# Patient Record
Sex: FEMALE | Race: WHITE
[De-identification: names, ages, dates, MRNs, and addresses within clinical notes are randomized per-mention and may not be internally consistent; named-entity substitution may affect disease eponyms.]

---

## 2018-03-15 ENCOUNTER — HOSPITAL ENCOUNTER (INPATIENT)
Dept: HOSPITAL 17 - NEPE | Age: 83
LOS: 6 days | Discharge: HOME HEALTH | DRG: 840 | End: 2018-03-21
Attending: HOSPITALIST | Admitting: HOSPITALIST
Payer: MEDICARE

## 2018-03-15 VITALS
OXYGEN SATURATION: 92 % | DIASTOLIC BLOOD PRESSURE: 112 MMHG | HEART RATE: 102 BPM | SYSTOLIC BLOOD PRESSURE: 180 MMHG | TEMPERATURE: 97.7 F

## 2018-03-15 VITALS — OXYGEN SATURATION: 98 %

## 2018-03-15 VITALS — HEART RATE: 104 BPM

## 2018-03-15 VITALS
SYSTOLIC BLOOD PRESSURE: 173 MMHG | DIASTOLIC BLOOD PRESSURE: 84 MMHG | OXYGEN SATURATION: 97 % | HEART RATE: 117 BPM | TEMPERATURE: 98.4 F

## 2018-03-15 VITALS
HEART RATE: 111 BPM | SYSTOLIC BLOOD PRESSURE: 152 MMHG | OXYGEN SATURATION: 97 % | DIASTOLIC BLOOD PRESSURE: 80 MMHG | TEMPERATURE: 98.4 F

## 2018-03-15 VITALS
SYSTOLIC BLOOD PRESSURE: 198 MMHG | HEART RATE: 101 BPM | RESPIRATION RATE: 28 BRPM | DIASTOLIC BLOOD PRESSURE: 91 MMHG | OXYGEN SATURATION: 94 %

## 2018-03-15 VITALS
RESPIRATION RATE: 22 BRPM | HEART RATE: 76 BPM | DIASTOLIC BLOOD PRESSURE: 89 MMHG | SYSTOLIC BLOOD PRESSURE: 172 MMHG | OXYGEN SATURATION: 96 % | TEMPERATURE: 98.2 F

## 2018-03-15 VITALS
DIASTOLIC BLOOD PRESSURE: 92 MMHG | SYSTOLIC BLOOD PRESSURE: 175 MMHG | OXYGEN SATURATION: 98 % | TEMPERATURE: 98.4 F | HEART RATE: 98 BPM

## 2018-03-15 VITALS — HEART RATE: 105 BPM

## 2018-03-15 VITALS
HEART RATE: 98 BPM | OXYGEN SATURATION: 97 % | RESPIRATION RATE: 18 BRPM | SYSTOLIC BLOOD PRESSURE: 176 MMHG | DIASTOLIC BLOOD PRESSURE: 100 MMHG

## 2018-03-15 VITALS — BODY MASS INDEX: 25.78 KG/M2 | WEIGHT: 145.51 LBS | HEIGHT: 63 IN

## 2018-03-15 VITALS — OXYGEN SATURATION: 97 %

## 2018-03-15 DIAGNOSIS — Z79.01: ICD-10-CM

## 2018-03-15 DIAGNOSIS — Z88.1: ICD-10-CM

## 2018-03-15 DIAGNOSIS — R74.8: ICD-10-CM

## 2018-03-15 DIAGNOSIS — I50.31: ICD-10-CM

## 2018-03-15 DIAGNOSIS — I11.0: ICD-10-CM

## 2018-03-15 DIAGNOSIS — E87.6: ICD-10-CM

## 2018-03-15 DIAGNOSIS — Z96.649: ICD-10-CM

## 2018-03-15 DIAGNOSIS — Z80.6: ICD-10-CM

## 2018-03-15 DIAGNOSIS — D64.9: ICD-10-CM

## 2018-03-15 DIAGNOSIS — Z88.0: ICD-10-CM

## 2018-03-15 DIAGNOSIS — J98.11: ICD-10-CM

## 2018-03-15 DIAGNOSIS — Z80.7: ICD-10-CM

## 2018-03-15 DIAGNOSIS — I48.2: ICD-10-CM

## 2018-03-15 DIAGNOSIS — E83.119: ICD-10-CM

## 2018-03-15 DIAGNOSIS — C94.6: Primary | ICD-10-CM

## 2018-03-15 DIAGNOSIS — I27.20: ICD-10-CM

## 2018-03-15 DIAGNOSIS — K57.30: ICD-10-CM

## 2018-03-15 DIAGNOSIS — I08.1: ICD-10-CM

## 2018-03-15 DIAGNOSIS — K80.20: ICD-10-CM

## 2018-03-15 DIAGNOSIS — Z88.2: ICD-10-CM

## 2018-03-15 LAB
ALBUMIN SERPL-MCNC: 3.1 GM/DL (ref 3.4–5)
ALP SERPL-CCNC: 213 U/L (ref 45–117)
ALT SERPL-CCNC: 378 U/L (ref 10–53)
AST SERPL-CCNC: 455 U/L (ref 15–37)
BASOPHILS # BLD AUTO: 0.3 TH/MM3 (ref 0–0.2)
BASOPHILS NFR BLD: 1.6 % (ref 0–2)
BILIRUB SERPL-MCNC: 2.7 MG/DL (ref 0.2–1)
BUN SERPL-MCNC: 19 MG/DL (ref 7–18)
CALCIUM SERPL-MCNC: 8.7 MG/DL (ref 8.5–10.1)
CHLORIDE SERPL-SCNC: 105 MEQ/L (ref 98–107)
CREAT SERPL-MCNC: 0.7 MG/DL (ref 0.5–1)
D-DIMER: 0.8 MG/L FEU (ref 0–0.5)
EOSINOPHIL # BLD: 0.2 TH/MM3 (ref 0–0.4)
EOSINOPHIL NFR BLD: 1.1 % (ref 0–4)
ERYTHROCYTE [DISTWIDTH] IN BLOOD BY AUTOMATED COUNT: 18 % (ref 11.6–17.2)
GFR SERPLBLD BASED ON 1.73 SQ M-ARVRAT: 80 ML/MIN (ref 89–?)
GLUCOSE SERPL-MCNC: 165 MG/DL (ref 74–106)
HCO3 BLD-SCNC: 21.2 MEQ/L (ref 21–32)
HCT VFR BLD CALC: 24.2 % (ref 35–46)
HGB BLD-MCNC: 7.9 GM/DL (ref 11.6–15.3)
INR PPP: 2.5 RATIO
LYMPHOCYTES # BLD AUTO: 2.3 TH/MM3 (ref 1–4.8)
LYMPHOCYTES NFR BLD AUTO: 12.2 % (ref 9–44)
LYMPHOCYTES: 14 % (ref 9–44)
MAGNESIUM SERPL-MCNC: 1.7 MG/DL (ref 1.5–2.5)
MCH RBC QN AUTO: 32.7 PG (ref 27–34)
MCHC RBC AUTO-ENTMCNC: 32.7 % (ref 32–36)
MCV RBC AUTO: 100.2 FL (ref 80–100)
MONOCYTE #: 3.8 TH/MM3 (ref 0–0.9)
MONOCYTES NFR BLD: 20 % (ref 0–8)
MONOCYTES: 13 % (ref 0–8)
MYELOCYTES NFR BLD: 2 % (ref 0–0)
NEUTROPHILS # BLD AUTO: 12.5 TH/MM3 (ref 1.8–7.7)
NEUTROPHILS NFR BLD AUTO: 65.1 % (ref 16–70)
NEUTS BAND # BLD MANUAL: 13.9 TH/MM3 (ref 1.8–7.7)
NEUTS BAND NFR BLD: 12 % (ref 0–6)
NEUTS SEG NFR BLD MANUAL: 59 % (ref 16–70)
PLATELET # BLD: 201 TH/MM3 (ref 150–450)
PMV BLD AUTO: 10.1 FL (ref 7–11)
PROT SERPL-MCNC: 7.8 GM/DL (ref 6.4–8.2)
PROTHROMBIN TIME: 25.1 SEC (ref 9.8–11.6)
RBC # BLD AUTO: 2.41 MIL/MM3 (ref 4–5.3)
SODIUM SERPL-SCNC: 137 MEQ/L (ref 136–145)
TOXIC GRANULES BLD QL SMEAR: (no result)
TROPONIN I SERPL-MCNC: (no result) NG/ML (ref 0.02–0.05)
WBC # BLD AUTO: 19.1 TH/MM3 (ref 4–11)

## 2018-03-15 PROCEDURE — 84439 ASSAY OF FREE THYROXINE: CPT

## 2018-03-15 PROCEDURE — 83550 IRON BINDING TEST: CPT

## 2018-03-15 PROCEDURE — 80061 LIPID PANEL: CPT

## 2018-03-15 PROCEDURE — 84443 ASSAY THYROID STIM HORMONE: CPT

## 2018-03-15 PROCEDURE — 82728 ASSAY OF FERRITIN: CPT

## 2018-03-15 PROCEDURE — 85027 COMPLETE CBC AUTOMATED: CPT

## 2018-03-15 PROCEDURE — 86900 BLOOD TYPING SEROLOGIC ABO: CPT

## 2018-03-15 PROCEDURE — 86140 C-REACTIVE PROTEIN: CPT

## 2018-03-15 PROCEDURE — 80053 COMPREHEN METABOLIC PANEL: CPT

## 2018-03-15 PROCEDURE — 86901 BLOOD TYPING SEROLOGIC RH(D): CPT

## 2018-03-15 PROCEDURE — 84425 ASSAY OF VITAMIN B-1: CPT

## 2018-03-15 PROCEDURE — 36430 TRANSFUSION BLD/BLD COMPNT: CPT

## 2018-03-15 PROCEDURE — 83880 ASSAY OF NATRIURETIC PEPTIDE: CPT

## 2018-03-15 PROCEDURE — 86850 RBC ANTIBODY SCREEN: CPT

## 2018-03-15 PROCEDURE — 85007 BL SMEAR W/DIFF WBC COUNT: CPT

## 2018-03-15 PROCEDURE — 88377 M/PHMTRC ALYS ISHQUANT/SEMIQ: CPT

## 2018-03-15 PROCEDURE — 83540 ASSAY OF IRON: CPT

## 2018-03-15 PROCEDURE — 83735 ASSAY OF MAGNESIUM: CPT

## 2018-03-15 PROCEDURE — 93005 ELECTROCARDIOGRAM TRACING: CPT

## 2018-03-15 PROCEDURE — 82550 ASSAY OF CK (CPK): CPT

## 2018-03-15 PROCEDURE — P9016 RBC LEUKOCYTES REDUCED: HCPCS

## 2018-03-15 PROCEDURE — 86920 COMPATIBILITY TEST SPIN: CPT

## 2018-03-15 PROCEDURE — 74177 CT ABD & PELVIS W/CONTRAST: CPT

## 2018-03-15 PROCEDURE — 80074 ACUTE HEPATITIS PANEL: CPT

## 2018-03-15 PROCEDURE — 81001 URINALYSIS AUTO W/SCOPE: CPT

## 2018-03-15 PROCEDURE — 76705 ECHO EXAM OF ABDOMEN: CPT

## 2018-03-15 PROCEDURE — 70450 CT HEAD/BRAIN W/O DYE: CPT

## 2018-03-15 PROCEDURE — 85610 PROTHROMBIN TIME: CPT

## 2018-03-15 PROCEDURE — 30233N1 TRANSFUSION OF NONAUTOLOGOUS RED BLOOD CELLS INTO PERIPHERAL VEIN, PERCUTANEOUS APPROACH: ICD-10-PCS | Performed by: FAMILY MEDICINE

## 2018-03-15 PROCEDURE — 94618 PULMONARY STRESS TESTING: CPT

## 2018-03-15 PROCEDURE — 84100 ASSAY OF PHOSPHORUS: CPT

## 2018-03-15 PROCEDURE — 85730 THROMBOPLASTIN TIME PARTIAL: CPT

## 2018-03-15 PROCEDURE — 85379 FIBRIN DEGRADATION QUANT: CPT

## 2018-03-15 PROCEDURE — 84484 ASSAY OF TROPONIN QUANT: CPT

## 2018-03-15 PROCEDURE — 85652 RBC SED RATE AUTOMATED: CPT

## 2018-03-15 PROCEDURE — 71275 CT ANGIOGRAPHY CHEST: CPT

## 2018-03-15 PROCEDURE — 83036 HEMOGLOBIN GLYCOSYLATED A1C: CPT

## 2018-03-15 PROCEDURE — 71045 X-RAY EXAM CHEST 1 VIEW: CPT

## 2018-03-15 PROCEDURE — 93306 TTE W/DOPPLER COMPLETE: CPT

## 2018-03-15 PROCEDURE — 81256 HFE GENE: CPT

## 2018-03-15 PROCEDURE — 82607 VITAMIN B-12: CPT

## 2018-03-15 PROCEDURE — 82746 ASSAY OF FOLIC ACID SERUM: CPT

## 2018-03-15 RX ADMIN — AZTREONAM SCH MLS/HR: 1 INJECTION, POWDER, LYOPHILIZED, FOR SOLUTION INTRAMUSCULAR; INTRAVENOUS at 20:45

## 2018-03-15 RX ADMIN — Medication SCH ML: at 20:47

## 2018-03-15 NOTE — RADRPT
EXAM DATE/TIME:  03/15/2018 18:09 

 

[facility] COMPARISON:     

No previous studies available for comparison.

 

 

INDICATIONS :     

Nausea,vomiting,diarrhea

                      

 

IV CONTRAST:     

80 cc Omnipaque 350 (iohexol) IV ; Cumulative dose for multiple exams.

 

 

ORAL CONTRAST:      

No oral contrast ingested.

                      

 

RADIATION DOSE:     

16.50 CTDIvol (mGy) 

 

 

MEDICAL HISTORY :     

Hypertension.  A-fib

 

SURGICAL HISTORY :      

Hysterectomy. 

 

ENCOUNTER:      

Initial

 

ACUITY:      

3 weeks

 

PAIN SCALE:      

0/10

 

LOCATION:         

Abdomen

 

TECHNIQUE:     

Volumetric scanning of the abdomen and pelvis was performed.  Using automated exposure control and ad
justment of the mA and/or kV according to patient size, radiation dose was kept as low as reasonably 
achievable to obtain optimal diagnostic quality images.  DICOM format image data is available electro
nically for review and comparison.  

 

FINDINGS:     

There is air in the lumen of the urinary bladder and also some air within the right wall of the urina
ry bladder. Etiology of this is uncertain. There is adjacent sigmoid colon diverticulosis but no high
-grade inflammatory changes are seen. No abscess or perforation. The pelvic cavity structures are con
siderably obscured due to to metallic streak artifact from the patient's eye lateral hip arthroplasti
es. There is small free fluid. Apparent previous hysterectomy.

 

Mildly heterogeneous liver suggesting vascular congestion. No focal hepatic lesions seen. The spleen,
 pancreas and adrenal glands and kidneys are within normal limits.

 

Shotty retroperitoneal and mesenteric lymph nodes. Nothing pathologic by size criteria.

 

There is a minimally displaced fracture of the left inferior pubic ramus without definite healing. De
generative changes are seen of the spine.

 

 

CONCLUSION:     

1. Air in the urinary bladder including some in the right wall of the bladder. This is nonspecific bu
t the differential would include emphysematous cystitis but this is considered somewhat unlikely as I
 don't see significant wall thickening or high-grade inflammatory changes. A colovesical fistula woul
d also be in the differential. Diverticulosis without definite diverticulitis seen of the adjacent si
gmoid colon. Finally, as there is age indeterminate but potentially acute left inferior pubic ramus f
racture, the air in the bladder could be posttraumatic. If so, only a small amount of urine has allie
d.

2. Suspected venous congestion of the liver. Otherwise, solid organs are within normal limits.

 

 

 

 Anderson Weldon MD on March 15, 2018 at 18:35           

Board Certified Radiologist.

 This report was verified electronically.

## 2018-03-15 NOTE — RADRPT
EXAM DATE/TIME:  03/15/2018 15:46 

 

[facility] COMPARISON:     

No previous studies available for comparison.

 

 

INDICATIONS :     

Patient complains of dizziness.

                      

 

RADIATION DOSE:     

33.50 CTDIvol (mGy) 

 

 

 

MEDICAL HISTORY :     

Hypertension.  A fib

 

SURGICAL HISTORY :      

Hysterectomy. 

 

ENCOUNTER:      

Initial

 

ACUITY:      

3 days

 

PAIN SCALE:      

0/10

 

LOCATION:        

cranial 

 

TECHNIQUE:     

Multiple contiguous axial images were obtained of the head.  Using automated exposure control and adj
ustment of the mA and/or kV according to patient size, radiation dose was kept as low as reasonably a
chievable to obtain optimal diagnostic quality images.   DICOM format image data is available electro
nically for review and comparison.  

 

FINDINGS:     

 

CEREBRUM:     

Mild cerebral atrophy is noted. Scattered old tiny lacunar infarcts are noted involving the left basa
l ganglia and the right cerebellar hemisphere. Mild periventricular white matter small vessel ischemi
c changes are noted bilaterally. No evidence of midline shift, mass lesion, hemorrhage or acute infar
ction.  No extra-axial fluid collections are seen.

 

POSTERIOR FOSSA:     

The cerebellum and brainstem are intact.  The 4th ventricle is midline.  The cerebellopontine angle i
s unremarkable.

 

EXTRACRANIAL:     

The visualized portion of the orbits is intact.

 

SKULL:     

The calvaria is intact.  No evidence of skull fracture.

 

CONCLUSION:     

1. Mild periventricular white matter small vessel ischemic changes bilaterally. 

2. Mild cerebral atrophy. 

3. Scattered old lacunar infarcts within the left basal ganglia and right cerebellar hemisphere. 

4. No acute infarct, acute hemorrhage, midline shift or extra-axial fluid collections. 

 

 

 Pelon García MD on March 15, 2018 at 15:57           

Board Certified Radiologist.

 This report was verified electronically.

## 2018-03-15 NOTE — HHI.HP
__________________________________________________





[facility]


Service


[facility]


Primary Care Physician


No Primary Care Physician


Admission Diagnosis





Symptomatic anemia/ syncope /leukocytosis


Diagnoses:  


Chief Complaint:  


Exertional dyspnea


Travel History


International Travel<30 Days:  No


Contact w/Intl Traveler <30 Da:  No


Traveled to Known Affected Are:  No


History of Present Illness


This is an 83-year-old female with past medical history of hypertension, atrial 

fibrillation, hemochromatosis, on chronic Coumadin who presented with 

exertional dyspnea.  Patient stated that about 3 weeks ago she had a virus in 

which she had GI symptoms such as nausea vomiting.  She stated that resolved 

she started feeling better but a few days ago symptoms recur.  She denies any 

abdominal pain.  Denies any urinary symptoms.  Patient then stated that she 

felt very fatigued and with exertion she felt very short of breath.  Patient 

stated that she never had these symptoms before.  She denies any chest pain, 

palpitation, lightheadedness dizziness.  Patient stated that symptoms were 

significant enough for her to come to emergency department.  She denies any GI 

bleed.  [place]  Denies any fevers or chills.  

Denies any cough.





All other review of system reviewed and negative.





During my interview with patient heart rate was above 100 during the majority 

of the interview and went up to 150.  Patient stated that she took her morning 

medication today.





Past Family Social History


Past Medical History


Hypertension


Hemochromatosis


Atrial fibrillation


On chronic Coumadin


Past Surgical History


2 hip replacement


Total hysterectomy secondary to fibroids


Lower extremity vein stripping


Reported Medications








Warfarin 2.5 Mg Tab 2.5 Mg PO DAILY


Valsartan 320 Mg Tab 320 Mg PO DAILY


Premarin (Estrogens Conjugated) 0.3 Mg Tab 0.3 Mg PO DAILY


Omeprazole 20 Mg Tab 20 Mg PO DAILY


Metoprolol Succinate/HCTZ 100-12.5  Mg-12.5 Mg Tab 1 Tab PO DAILY


Cartia Xt (Diltiazem ER 24 HR) 120 Mg Caper 120 Mg PO DAILY


Allergies:  


Coded Allergies:  


     Penicillins (Verified  Allergy, Severe, vomiting, 3/15/18)


     Sulfa (Sulfonamide Antibiotics) (Verified  Allergy, Severe, vomiting, 3/15/

18)


     ciprofloxacin (Verified  Allergy, Severe, vomiting, 3/15/18)


Active Ordered Medications





Current Medications


Sodium Chloride (NS Flush) 2 ml UNSCH  PRN IVF FLUSH AFTER USING IV ACCESS;  

Start 3/15/18 at 15:15


Sodium Chloride 250 ml @  15 mls/hr ONCE  ONCE IV ;  Start 3/15/18 at 17:00;  

Stop 3/16/18 at 09:39


Sodium Chloride 500 ml @  500 mls/hr BOLUS  ONCE IV  Last administered on 3/15/

18at 17:02;  Start 3/15/18 at 17:00;  Stop 3/15/18 at 17:59


Sodium Chloride (NS Flush) 2 ml UNSCH  PRN IV FLUSH FLUSH AFTER USING IV ACCESS

;  Start 3/15/18 at 17:15;  Stop 3/15/18 at 17:15;  Status DC


Sodium Chloride (NS Flush) 2 ml BID IV FLUSH ;  Start 3/15/18 at 21:00;  Stop 3/

15/18 at 21:00;  Status DC


Nitroglycerin (Nitroglycerin 2% Oint) 1 inch Q6HR TOP ;  Start 3/15/18 at 18:00

;  Stop 3/15/18 at 18:00;  Status DC


Aspirin (Aspirin) 325 mg DAILY PO ;  Start 3/16/18 at 09:00;  Stop 3/16/18 at 09

:00;  Status DC


Sodium Chloride (NS Flush) 2 ml UNSCH  PRN IV FLUSH FLUSH AFTER USING IV ACCESS

;  Start 3/15/18 at 17:30;  Status UNV


Sodium Chloride (NS Flush) 2 ml BID IV FLUSH ;  Start 3/15/18 at 21:00;  Status 

UNV


Acetaminophen (Tylenol) 650 mg Q4H  PRN PO TEMP > 100.4;  Start 3/15/18 at 17:30

;  Status UNV


Naloxone HCl (Narcan Inj) 0.4 mg UNSCH  PRN IV PUSH SEE LABEL COMMENTS;  Start 3

/15/18 at 17:30;  Status UNV


Magnesium Hydroxide (Milk Of Magnesia Liq) 30 ml Q12H  PRN PO Mild constipation

;  Start 3/15/18 at 17:30;  Status UNV


Sennosides (Senokot) 17.2 mg Q12H  PRN PO Moderate constipation;  Start 3/15/18 

at 17:30;  Status UNV


Lactulose (Lactulose Liq) 30 ml DAILY  PRN PO SEVERE CONSITIPATION;  Start 3/15/

18 at 17:30;  Status UNV


Family History


Brother had a history of leukemia  at the age of 40


Mother has a history of lymphoma





Physical Exam


Vital Signs





Vital Signs








  Date Time  Temp Pulse Resp B/P (MAP) Pulse Ox O2 Delivery O2 Flow Rate FiO2


 


3/15/18 17:09  98 18 176/100 (125) 97 Nasal Cannula 2.00 


 


3/15/18 15:39     (126) 97 Nasal Cannula 2.00 


 


3/15/18 15:03  101 28 198/91 (126) 94 Room Air  


 


3/15/18 14:59  72 27  92 Room Air  


 


3/15/18 14:49 98.2 76 22 172/89 (116) 96   








Physical Exam


GENERAL: This is a well-nourished, well-developed patient, in no apparent 

distress.


SKIN: No rashes, ecchymoses or lesions. Cool and dry.


HEAD: Atraumatic. Normocephalic. No temporal or scalp tenderness.


EYES: Pupils equal round and reactive. Extraocular motions intact. No scleral 

icterus. No injection or drainage. 


ENT: Nose without bleeding, purulent drainage or septal hematoma. Throat 

without erythema, tonsillar hypertrophy or exudate. Uvula midline. Airway 

patent.


NECK: Trachea midline. No JVD or lymphadenopathy. Supple, nontender, no 

meningeal signs.


CARDIOVASCULAR: Regular rate and rhythm without murmurs, gallops, or rubs. 


RESPIRATORY: Clear to auscultation. Breath sounds equal bilaterally. No wheezes

, rales, or rhonchi.  


GASTROINTESTINAL: Abdomen soft, non-tender, nondistended. No hepato-splenomegaly

, or palpable masses. No guarding.


MUSCULOSKELETAL: Extremities without clubbing, cyanosis, or edema. No joint 

tenderness, effusion, or edema noted. No calf tenderness. Negative Homans sign 

bilaterally.


NEUROLOGICAL: Awake and alert. Cranial nerves II through XII intact.  Motor and 

sensory grossly within normal limits. Five out of 5 muscle strength in all 

muscle groups.  Normal speech.


Laboratory





Laboratory Tests








Test


  3/15/18


15:15


 


White Blood Count 19.1 


 


Red Blood Count 2.41 


 


Hemoglobin 7.9 


 


Hematocrit 24.2 


 


Mean Corpuscular Volume 100.2 


 


Mean Corpuscular Hemoglobin 32.7 


 


Mean Corpuscular Hemoglobin


Concent 32.7 


 


 


Red Cell Distribution Width 18.0 


 


Platelet Count 201 


 


Mean Platelet Volume 10.1 


 


Neutrophils (%) (Auto) 65.1 


 


Lymphocytes (%) (Auto) 12.2 


 


Monocytes (%) (Auto) 20.0 


 


Eosinophils (%) (Auto) 1.1 


 


Basophils (%) (Auto) 1.6 


 


Neutrophils # (Auto) 12.5 


 


Lymphocytes # (Auto) 2.3 


 


Monocytes # (Auto) 3.8 


 


Eosinophils # (Auto) 0.2 


 


Basophils # (Auto) 0.3 


 


CBC Comment AUTO DIFF 


 


Differential Total Cells


Counted 100 


 


 


Neutrophils % (Manual) 59 


 


Band Neutrophils % 12 


 


Lymphocytes % 14 


 


Monocytes % 13 


 


Neutrophils # (Manual) 13.9 


 


Myelocytes 2 


 


Differential Comment


  FINAL DIFF


MANUAL


 


Toxic Granulation 1+ 


 


Platelet Estimate NORMAL 


 


Platelet Morphology Comment NORMAL 


 


Prothrombin Time 25.1 


 


Prothromb Time International


Ratio 2.5 


 


 


Activated Partial


Thromboplast Time 36.9 


 


 


D-Dimer Quantitative (PE/DVT) 0.80 


 


Blood Urea Nitrogen 19 


 


Creatinine 0.70 


 


Random Glucose 165 


 


Total Protein 7.8 


 


Albumin 3.1 


 


Calcium Level 8.7 


 


Magnesium Level 1.7 


 


Alkaline Phosphatase 213 


 


Aspartate Amino Transf


(AST/SGOT) 455 


 


 


Alanine Aminotransferase


(ALT/SGPT) 378 


 


 


Total Bilirubin 2.7 


 


Sodium Level 137 


 


Potassium Level 3.9 


 


Chloride Level 105 


 


Carbon Dioxide Level 21.2 


 


Anion Gap 11 


 


Estimat Glomerular Filtration


Rate 80 


 


 


Troponin I LESS THAN 0.02 








Result Diagram:  


3/15/18 1515                                                                   

             3/15/18 1515





Imaging





Last Impressions








Head CT 3/15/18 151 Signed





Impressions: 





 Service Date/Time:  Thursday, March 15, 2018 15:46 - CONCLUSION:  1. Mild 





 periventricular white matter small vessel ischemic changes bilaterally.  2. 

Mild 





 cerebral atrophy.  3. 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 





 ganglia and right cerebellar hemisphere.  4. No acute infarct, acute hemorrhage

, 





 midline shift or extra-axial fluid collections.     Pelon García MD 


 


Chest X-Ray 3/15/18 151 Signed





Impressions: 





 Service Date/Time:  Thursday, March 15, 2018 15:31 - CONCLUSION:  Bibasilar 





 patchiness consistent with atelectasis and/or infiltrates.     Pelon García MD 











Caprini VTE Risk Assessment


Caprini VTE Risk Assessment:  Mod/High Risk (score >= 2)


Caprini Risk Assessment Model











 Point Value = 1          Point Value = 2  Point Value = 3  Point Value = 5


 


Age 41-60


Minor surgery


BMI > 25 kg/m2


Swollen legs


Varicose veins


Pregnancy or postpartum


History of unexplained or recurrent


   spontaneous 


Oral contraceptives or hormone


   replacement


Sepsis (< 1 month)


Serious lung disease, including


   pneumonia (< 1 month)


Abnormal pulmonary function


Acute myocardial infarction


Congestive heart failure (< 1 month)


History of inflammatory bowel disease


Medical patient at bed rest Age 61-74


Arthroscopic surgery


Major open surgery (> 45 min)


Laparoscopic surgery (> 45 min)


Malignancy


Confined to bed (> 72 hours)


Immobilizing plaster cast


Central venous access Age >= 75


History of VTE


Family history of VTE


Factor V Leiden


Prothrombin 18714Q


Lupus anticoagulant


Anticardiolipin antibodies


Elevated serum homocysteine


Heparin-induced thrombocytopenia


Other congenital or acquired


   thrombophilia Stroke (< 1 month)


Elective arthroplasty


Hip, pelvis, or leg fracture


Acute spinal cord injury (< 1 month)








Prophylaxis Regimen











   Total Risk


Factor Score Risk Level Prophylaxis Regimen


 


0-1      Low Early ambulation


 


2 Moderate Order ONE of the following:


*Sequential Compression Device (SCD)


*Heparin 5000 units SQ BID


 


3-4 Higher Order ONE of the following medications:


*Heparin 5000 units SQ TID


*Enoxaparin/Lovenox 40 mg SQ daily (WT < 150 kg, CrCl > 30 mL/min)


*Enoxaparin/Lovenox 30 mg SQ daily (WT < 150 kg, CrCl > 10-29 mL/min)


*Enoxaparin/Lovenox 30 mg SQ BID (WT < 150 kg, CrCl > 30 mL/min)


AND/OR


*Sequential Compression Device (SCD)


 


5 or more Highest Order ONE of the following medications:


*Heparin 5000 units SQ TID (Preferred with Epidurals)


*Enoxaparin/Lovenox 40 mg SQ daily (WT < 150 kg, CrCl > 30 mL/min)


*Enoxaparin/Lovenox 30 mg SQ daily (WT < 150 kg, CrCl > 10-29 mL/min)


*Enoxaparin/Lovenox 30 mg SQ BID (WT < 150 kg, CrCl > 30 mL/min)


AND


*Sequential Compression Device (SCD)











Assessment and Plan


Assessment and Plan


This is an 83-year-old female past medical history of atrial fibrillation who 

presented with exertional dyspnea





Exertional dyspnea


-May be secondary to atrial fibrillation with RVR since noted during her 

examination patient heart rate went above 100 multiple times versus symptomatic 

anemia versus questionable pneumonia.


-Blood transfusion already order by ED provider.  Will get a posttransfusion 

hemoglobin.  Patient has no active bleeding.  Will get a 2D echo and trend 

troponin. ?  Monitor over telemetry.





Symptomatic anemia


-Patient does have a history of anemia but she stated last hemoglobin down was 

11.


-Blood transfusion ordered by ED provider.  Will get a posttransfusion 

hemoglobin.


-We will check for Hemoccult stool.  Patient is on Coumadin to prevent CVA.  

There is no active bleeding noted.  Will monitor and trend.


-Continue to trend hemoglobin for stabilization.





Atrial fibrillation with RVR


-Patient heart rate varies from .  Her heart rate in the ED mostly in the 

lower 100s-110s.


-We will restart her home medication.  May improve after blood transfusion.  

Will give Cardizem 30 mg as needed for heart rate greater than 100.


-Continue with Coumadin.





Chronic anticoagulation


-INR 2.5.  Continuing Coumadin.  Will have pharmacy dose.





Leukocytosis


-WBC 19,000.  Chest x-ray showed Chest x-ray shows bilateral basilar 

infiltrate.  This may represent pneumonia versus atelectasis.  Pending UA.


-Questionable community acquired pneumonia


-Patient did recently have a viral illness.   Most likely atelectasis but 

patient does have a significant white count.


-We will treat empirically with antibiotics until infectious etiology ruled 

out.  We will start aztreonam since patient is allergic to penicillin, sulfa 

drugs, and Cipro.


-Continue to trend leukocytosis.





Elevated LFTs


-Patient does have a history of hemochromatosis.


-We will get liver ultrasound and hepatitis panel.  Trend LFTs.





DVT prophylaxis


-Currently on Coumadin.  Continue with Coumadin.


Code Status


full


Discussed Condition With


Patient and her .





Physician Certification


2 Midnight Certification Type:  Admission for Inpatient Services


Order for Inpatient Services


The services are ordered in accordance with Medicare regulations or non-

Medicare payer requirements, as applicable.  In the case of services not 

specified as inpatient-only, they are appropriately provided as inpatient 

services in accordance with the 2-midnight benchmark.


Estimated LOS (days):  3


3 days is the estimated time the patient will need to remain in the hospital, 

assuming treatment plan goals are met and no additional complications.


Post-Hospital Plan:  Larisa Lawler MD Mar 15, 2018 17:31

## 2018-03-15 NOTE — RADRPT
EXAM DATE/TIME:  03/15/2018 18:09 

 

CORRECTION

Corrected on: March 15, 2018; 

 

 

 

[facility] COMPARISON:     

No previous studies available for comparison.

 

 

INDICATIONS :     

Syncope,shorness of breath.

                      

 

IV CONTRAST:     

80 cc Omnipaque 350 (iohexol) IV ; Cumulative dose for multiple exams.

 

 

RADIATION DOSE:     

8.52 CTDIvol (mGy) 

 

 

MEDICAL HISTORY :     

Hypertension.  A-fib

 

SURGICAL HISTORY :      

Hysterectomy. 

 

ENCOUNTER:      

Initial

 

ACUITY:      

3 days

 

PAIN SCALE:      

0/10

 

LOCATION:        

chest 

 

TECHNIQUE:     

Volumetric scanning of the chest was performed using a pulmonary embolism protocol MIP images were re
constructed.  Using automated exposure control and adjustment of the mA and/or kV according to patien
t size, radiation dose was kept as low as reasonably achievable to obtain optimal diagnostic quality 
images.   DICOM format image data is available electronically for review and comparison.  

 

Follow-up recommendations for detected pulmonary nodules are based at a minimum on nodule size and pa
tient risk factors according to Fleischner Society Guidelines.

 

FINDINGS:     

No pulmonary embolus.

 

Mild cardiac enlargement, mostly the right and left atria. Coronary artery calcification noted.

 

Small to moderate bilateral pleural effusions are present and there is associated mild dependent/comp
ressive atelectasis of the bases.

 

There are mildly enlarged mediastinal lymph nodes for example pretracheal 17 x 22 mm and subcarinal 2
1 x 30 mm. Shotty bilateral axillary lymph nodes.

 

CONCLUSION:     

1. No pulmonary embolus.

2. Small to moderate bilateral pleural effusions.

3. Mild dependent/compressive atelectasis of both bases.

4. Mild biatrial enlargement of the heart.

5. Coronary artery calcification.

6. Nonspecific mediastinal lymphadenopathy.

 

 

 

 

 Anderson Weldon MD on March 15, 2018 at 18:30           

Board Certified Radiologist.

 This report was verified electronically.  Anderson Weldon MD on March 15, 2018 at 18:40           

Board Certified Radiologist.

 This report was verified electronically.

## 2018-03-15 NOTE — RADRPT
EXAM DATE/TIME:  03/15/2018 15:31 

 

[facility] COMPARISON:     

No previous studies available for comparison.

 

                     

INDICATIONS :     

Shortness of breath- Flu like symptoms.

                     

 

MEDICAL HISTORY :     

Hypertension.       AFIB.   

 

SURGICAL HISTORY :     

Hysterectomy.   

 

ENCOUNTER:     

Initial                                        

 

ACUITY:     

1 day      

 

PAIN SCORE:     

0/10

 

LOCATION:     

Bilateral chest 

 

FINDINGS:     

Bibasilar patchiness is noted consistent with atelectasis and/or infiltrates. The heart is normal. Th
e pulmonary vascular pattern is normal.

 

CONCLUSION:     

Bibasilar patchiness consistent with atelectasis and/or infiltrates. 

 

 

 Pelon García MD on March 15, 2018 at 16:09           

Board Certified Radiologist.

 This report was verified electronically.

## 2018-03-15 NOTE — PD
HPI


Chief Complaint:  Syncope/Near-Syncope


Time Seen by Provider:  15:12


Travel History


International Travel<30 days:  No


Contact w/Intl Traveler<30days:  No


Traveled to known affect area:  No





History of Present Illness


HPI


83-year-old female patient with history of hypertension, A. fib currently on 

Coumadin, presents to the ER today because she has had about a 3 day history of 

dyspnea on exertion, lightheadedness, general weakness according to her 

.  She denies any fevers, chest pains, coughing, or any other symptoms.





Modifying Factors: None


Associated Signs & Symptoms: Dyspnea on exertion, lightheadedness, general 

weakness


Risk Factors: None





PFSH


Past Medical History


Hx Anticoagulant Therapy:  Yes (warfarin)


Atrial Fibrillation:  Yes


Hypertension:  Yes





Past Surgical History


Hysterectomy:  Yes





Social History


Alcohol Use:  Yes (daily couple glasses )


Tobacco Use:  No


Substance Use:  No





Allergies-Medications


(Allergen,Severity, Reaction):  


Coded Allergies:  


     Penicillins (Verified  Allergy, Severe, vomiting, 3/15/18)


     Sulfa (Sulfonamide Antibiotics) (Verified  Allergy, Severe, vomiting, 3/15/

18)


     ciprofloxacin (Verified  Allergy, Severe, vomiting, 3/15/18)


Reported Meds & Prescriptions





Reported Meds & Active Scripts


Active


Reported


Warfarin 2.5 Mg Tab 2.5 Mg PO DAILY


Valsartan 320 Mg Tab 320 Mg PO DAILY


Premarin (Estrogens Conjugated) 0.3 Mg Tab 0.3 Mg PO DAILY


Omeprazole 20 Mg Tab 20 Mg PO DAILY


Metoprolol Succinate/HCTZ 100-12.5  Mg-12.5 Mg Tab 1 Tab PO DAILY


Cartia Xt (Diltiazem ER 24 HR) 120 Mg Caper 120 Mg PO DAILY








Review of Systems


Except as stated in HPI:  all other systems reviewed are Neg





Physical Exam


Narrative


GENERAL: Well-developed elderly female patient currently in mild distress.  

Awake and oriented 3.


SKIN: Focused skin assessment warm/dry.


HEAD: Atraumatic. Normocephalic. 


EYES: Pupils equal and round. No scleral icterus. No injection or drainage. 


ENT: No nasal bleeding or discharge.  Mucous membranes pink and moist.


NECK: Trachea midline. No JVD. 


CARDIOVASCULAR: Irregularly irregular.


RESPIRATORY: No accessory muscle use. Clear to auscultation. Breath sounds 

equal bilaterally. 


GASTROINTESTINAL: Abdomen soft, non-tender, nondistended. Hepatic and splenic 

margins not palpable. 


RECTAL EXAM: No masses or tenderness, stool is brown.


MUSCULOSKELETAL: No obvious deformities. No clubbing.  No cyanosis.  No edema. 


NEUROLOGICAL: Awake and alert. No obvious cranial nerve deficits.  Motor 

grossly within normal limits. Normal speech.


PSYCHIATRIC: Appropriate mood and affect; insight and judgment normal.





Data


Data


Last Documented VS





Vital Signs








  Date Time  Temp Pulse Resp B/P (MAP) Pulse Ox O2 Delivery O2 Flow Rate FiO2


 


3/15/18 15:39     (126) 97 Nasal Cannula 2.00 


 


3/15/18 15:03  101 28     


 


3/15/18 14:49 98.2       








Orders





 Orders


Electrocardiogram (3/15/18 15:12)


Complete Blood Count With Diff (3/15/18 15:12)


Comprehensive Metabolic Panel (3/15/18 15:12)


Magnesium (Mg) (3/15/18 15:12)


Troponin I (3/15/18 15:12)


Chest, Single Ap (3/15/18 15:12)


Ct Brain W/O Iv Contrast(Rout) (3/15/18 15:12)


Ecg Monitoring (3/15/18 15:12)


Iv Access Insert/Monitor (3/15/18 15:12)


Oximetry (3/15/18 15:12)


Sodium Chloride 0.9% Flush (Ns Flush) (3/15/18 15:15)


Prothrombin Time / Inr (Pt) (3/15/18 15:19)


Act Partial Throm Time (Ptt) (3/15/18 15:19)


D-Dimer (3/15/18 15:19)


Type And Screen (3/15/18 16:52)


Red Blood Cells (Rbc) (3/15/18 16:52)


Blood Product Administration (3/15/18 16:52)


Sodium Chlor 0.9% 250 Ml Inj (Ns 250 Ml (3/15/18 17:00)


Sodium Chlorid 0.9% 500 Ml Inj (Ns 500 M (3/15/18 17:00)


Ct Pulmonary Angiogram (3/15/18 17:02)


Resp Oxygen Nasal Cannula (3/15/18 )


Electrocardiogram (3/15/18 17:05)


Electrocardiogram (3/15/18 20:05)


Sodium Chloride 0.9% Flush (Ns Flush) (3/15/18 17:15)


Sodium Chloride 0.9% Flush (Ns Flush) (3/15/18 21:00)


Nitroglycerin 2% Oint (Nitroglycerin 2% (3/15/18 18:00)


Aspirin (Aspirin) (3/16/18 09:00)





Labs





Laboratory Tests








Test


  3/15/18


15:15


 


White Blood Count 19.1 TH/MM3 


 


Red Blood Count 2.41 MIL/MM3 


 


Hemoglobin 7.9 GM/DL 


 


Hematocrit 24.2 % 


 


Mean Corpuscular Volume 100.2 FL 


 


Mean Corpuscular Hemoglobin 32.7 PG 


 


Mean Corpuscular Hemoglobin


Concent 32.7 % 


 


 


Red Cell Distribution Width 18.0 % 


 


Platelet Count 201 TH/MM3 


 


Mean Platelet Volume 10.1 FL 


 


Neutrophils (%) (Auto) 65.1 % 


 


Lymphocytes (%) (Auto) 12.2 % 


 


Monocytes (%) (Auto) 20.0 % 


 


Eosinophils (%) (Auto) 1.1 % 


 


Basophils (%) (Auto) 1.6 % 


 


Neutrophils # (Auto) 12.5 TH/MM3 


 


Lymphocytes # (Auto) 2.3 TH/MM3 


 


Monocytes # (Auto) 3.8 TH/MM3 


 


Eosinophils # (Auto) 0.2 TH/MM3 


 


Basophils # (Auto) 0.3 TH/MM3 


 


CBC Comment AUTO DIFF 


 


Differential Total Cells


Counted 100 


 


 


Neutrophils % (Manual) 59 % 


 


Band Neutrophils % 12 % 


 


Lymphocytes % 14 % 


 


Monocytes % 13 % 


 


Neutrophils # (Manual) 13.9 TH/MM3 


 


Myelocytes 2 % 


 


Differential Comment


  FINAL DIFF


MANUAL


 


Toxic Granulation 1+ 


 


Platelet Estimate NORMAL 


 


Platelet Morphology Comment NORMAL 


 


Prothrombin Time 25.1 SEC 


 


Prothromb Time International


Ratio 2.5 RATIO 


 


 


Activated Partial


Thromboplast Time 36.9 SEC 


 


 


D-Dimer Quantitative (PE/DVT) 0.80 MG/L FEU 


 


Blood Urea Nitrogen 19 MG/DL 


 


Creatinine 0.70 MG/DL 


 


Random Glucose 165 MG/DL 


 


Total Protein 7.8 GM/DL 


 


Albumin 3.1 GM/DL 


 


Calcium Level 8.7 MG/DL 


 


Magnesium Level 1.7 MG/DL 


 


Alkaline Phosphatase 213 U/L 


 


Aspartate Amino Transf


(AST/SGOT) 455 U/L 


 


 


Alanine Aminotransferase


(ALT/SGPT) 378 U/L 


 


 


Total Bilirubin 2.7 MG/DL 


 


Sodium Level 137 MEQ/L 


 


Potassium Level 3.9 MEQ/L 


 


Chloride Level 105 MEQ/L 


 


Carbon Dioxide Level 21.2 MEQ/L 


 


Anion Gap 11 MEQ/L 


 


Estimat Glomerular Filtration


Rate 80 ML/MIN 


 


 


Troponin I


  LESS THAN 0.02


NG/ML











MDM


Medical Decision Making


Medical Screen Exam Complete:  Yes


Emergency Medical Condition:  Yes


Medical Record Reviewed:  Yes


Interpretation(s)


EKG shows A. fib at a rate of 89 bpm.  No signs of acute ST elevations or 

depressions.








Laboratory Tests








Test


  3/15/18


15:15


 


White Blood Count


  19.1 TH/MM3


(4.0-11.0)


 


Red Blood Count


  2.41 MIL/MM3


(4.00-5.30)


 


Hemoglobin


  7.9 GM/DL


(11.6-15.3)


 


Hematocrit


  24.2 %


(35.0-46.0)


 


Mean Corpuscular Volume


  100.2 FL


(80.0-100.0)


 


Red Cell Distribution Width


  18.0 %


(11.6-17.2)


 


Monocytes (%) (Auto)


  20.0 %


(0.0-8.0)


 


Neutrophils # (Auto)


  12.5 TH/MM3


(1.8-7.7)


 


Monocytes # (Auto)


  3.8 TH/MM3


(0-0.9)


 


Basophils # (Auto)


  0.3 TH/MM3


(0-0.2)


 


Band Neutrophils % 12 % (0-6) 


 


Monocytes % 13 % (0-8) 


 


Neutrophils # (Manual)


  13.9 TH/MM3


(1.8-7.7)


 


Myelocytes 2 % (0-0) 


 


Toxic Granulation 1+ (NORMAL) 


 


Prothrombin Time


  25.1 SEC


(9.8-11.6)


 


Activated Partial


Thromboplast Time 36.9 SEC


(24.3-30.1)


 


D-Dimer Quantitative (PE/DVT)


  0.80 MG/L FEU


(0.00-0.50)


 


Blood Urea Nitrogen


  19 MG/DL


(7-18)


 


Random Glucose


  165 MG/DL


()


 


Albumin


  3.1 GM/DL


(3.4-5.0)


 


Alkaline Phosphatase


  213 U/L


()


 


Aspartate Amino Transf


(AST/SGOT) 455 U/L


(15-37)


 


Alanine Aminotransferase


(ALT/SGPT) 378 U/L


(10-53)


 


Total Bilirubin


  2.7 MG/DL


(0.2-1.0)


 


Estimat Glomerular Filtration


Rate 80 ML/MIN


(>89)


 


Troponin I


  LESS THAN 0.02


NG/ML








Last 24 hours Impressions








Head CT 3/15/18 1512 Signed





Impressions: 





 Service Date/Time:  Thursday, March 15, 2018 15:46 - CONCLUSION:  1. Mild 





 periventricular white matter small vessel ischemic changes bilaterally.  2. 

Mild 





 cerebral atrophy.  3. 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 





 ganglia and right cerebellar hemisphere.  4. No acute infarct, acute hemorrhage

, 





 midline shift or extra-axial fluid collections.     Pelon García MD 


 


Chest X-Ray 3/15/18 1512 Signed





Impressions: 





 Service Date/Time:  Thursday, March 15, 2018 15:31 - CONCLUSION:  Bibasilar 





 patchiness consistent with atelectasis and/or infiltrates.     Pelon García MD 








Differential Diagnosis


Dyspnea on exertion, dizziness: Dehydration versus dysrhythmias versus CHF 

versus pneumonia


Narrative Course


Lab work shows no signs of lab work shows significant anemia.  IV fluids and 

blood was given in the ER.  Hemoccult is negative.  Blood cell count is 

elevated as well it is uncertain whether this is a pneumonia or not.  She does 

have elevated d-dimer and CTA was ordered for further evaluation.  Her liver 

enzymes are fairly elevated as well and CT abdomen was also ordered for further 

evaluation as well.  CT the brain was negative for any signs of acute 

intracranial processes.  Patient has several issues going on at this point and 

will need to be evaluated further.  Case is discussed with Dr. Pandya for 

admission for further evaluation and treatment.





[facility] Point of Care


Internal Pos. & Neg. Controls:  Passed


Fecal Specimen Occult Blood:  Negative





Diagnosis





 Primary Impression:  


 Symptomatic anemia


 Additional Impressions:  


 Near syncope


 Elevated liver enzymes


 Leukocytosis





Admitting Information


Admitting Physician Requests:  Admit











Angelina Rodriguez MD Mar 15, 2018 15:46

## 2018-03-16 VITALS — HEART RATE: 84 BPM

## 2018-03-16 VITALS
RESPIRATION RATE: 18 BRPM | DIASTOLIC BLOOD PRESSURE: 89 MMHG | OXYGEN SATURATION: 96 % | SYSTOLIC BLOOD PRESSURE: 167 MMHG | HEART RATE: 20 BPM | TEMPERATURE: 97.9 F

## 2018-03-16 VITALS — OXYGEN SATURATION: 94 %

## 2018-03-16 VITALS
OXYGEN SATURATION: 100 % | TEMPERATURE: 98.3 F | HEART RATE: 89 BPM | DIASTOLIC BLOOD PRESSURE: 82 MMHG | SYSTOLIC BLOOD PRESSURE: 145 MMHG

## 2018-03-16 VITALS — HEART RATE: 96 BPM

## 2018-03-16 VITALS
SYSTOLIC BLOOD PRESSURE: 147 MMHG | OXYGEN SATURATION: 100 % | DIASTOLIC BLOOD PRESSURE: 93 MMHG | TEMPERATURE: 98.3 F | HEART RATE: 106 BPM

## 2018-03-16 VITALS
DIASTOLIC BLOOD PRESSURE: 92 MMHG | OXYGEN SATURATION: 98 % | TEMPERATURE: 98.4 F | HEART RATE: 98 BPM | SYSTOLIC BLOOD PRESSURE: 175 MMHG

## 2018-03-16 VITALS
TEMPERATURE: 98.3 F | RESPIRATION RATE: 20 BRPM | HEART RATE: 99 BPM | SYSTOLIC BLOOD PRESSURE: 160 MMHG | DIASTOLIC BLOOD PRESSURE: 85 MMHG | OXYGEN SATURATION: 96 %

## 2018-03-16 VITALS
SYSTOLIC BLOOD PRESSURE: 178 MMHG | OXYGEN SATURATION: 94 % | RESPIRATION RATE: 22 BRPM | TEMPERATURE: 97.8 F | HEART RATE: 97 BPM | DIASTOLIC BLOOD PRESSURE: 112 MMHG

## 2018-03-16 VITALS — HEART RATE: 100 BPM

## 2018-03-16 VITALS
SYSTOLIC BLOOD PRESSURE: 152 MMHG | TEMPERATURE: 98.5 F | OXYGEN SATURATION: 94 % | DIASTOLIC BLOOD PRESSURE: 92 MMHG | RESPIRATION RATE: 22 BRPM | HEART RATE: 97 BPM

## 2018-03-16 VITALS — HEART RATE: 94 BPM

## 2018-03-16 VITALS — HEART RATE: 92 BPM

## 2018-03-16 VITALS — HEART RATE: 108 BPM

## 2018-03-16 VITALS — HEART RATE: 98 BPM

## 2018-03-16 VITALS — HEART RATE: 83 BPM

## 2018-03-16 LAB
% SATURATION IRON PROFILE: (no result) % (ref 20–50)
ALBUMIN SERPL-MCNC: 2.8 GM/DL (ref 3.4–5)
ALP SERPL-CCNC: 188 U/L (ref 45–117)
ALT SERPL-CCNC: 261 U/L (ref 10–53)
AST SERPL-CCNC: 237 U/L (ref 15–37)
BILIRUB SERPL-MCNC: 3.1 MG/DL (ref 0.2–1)
BUN SERPL-MCNC: 16 MG/DL (ref 7–18)
CALCIUM SERPL-MCNC: 8.1 MG/DL (ref 8.5–10.1)
CHLORIDE SERPL-SCNC: 107 MEQ/L (ref 98–107)
CHOLEST SERPL-MCNC: 97 MG/DL (ref 120–200)
CHOLESTEROL/ HDL RATIO: 4.66 RATIO
CREAT SERPL-MCNC: 0.53 MG/DL (ref 0.5–1)
ERYTHROCYTE [DISTWIDTH] IN BLOOD BY AUTOMATED COUNT: 18.2 % (ref 11.6–17.2)
FERRITIN SERPL-MCNC: 977 NG/ML (ref 8–252)
FOLATE SERPL-MCNC: 4.3 NG/ML (ref 3.1–17.5)
GFR SERPLBLD BASED ON 1.73 SQ M-ARVRAT: 111 ML/MIN (ref 89–?)
GLUCOSE SERPL-MCNC: 108 MG/DL (ref 74–106)
HBA1C MFR BLD: 6 % (ref 4.3–6)
HCO3 BLD-SCNC: 21 MEQ/L (ref 21–32)
HCT VFR BLD CALC: 29.7 % (ref 35–46)
HDLC SERPL-MCNC: 20.8 MG/DL (ref 40–60)
HGB BLD-MCNC: 10.2 GM/DL (ref 11.6–15.3)
INR PPP: 2.2 RATIO
IRON (FE): 232 MCG/DL (ref 50–170)
LDLC SERPL-MCNC: 54 MG/DL (ref 0–99)
MAGNESIUM SERPL-MCNC: 1.8 MG/DL (ref 1.5–2.5)
MCH RBC QN AUTO: 32.7 PG (ref 27–34)
MCHC RBC AUTO-ENTMCNC: 34.3 % (ref 32–36)
MCV RBC AUTO: 95.4 FL (ref 80–100)
PHOSPHATE SERPL-MCNC: 1.7 MG/DL (ref 2.5–4.9)
PLATELET # BLD: 186 TH/MM3 (ref 150–450)
PMV BLD AUTO: 10.4 FL (ref 7–11)
PROT SERPL-MCNC: 7.1 GM/DL (ref 6.4–8.2)
PROTHROMBIN TIME: 22.1 SEC (ref 9.8–11.6)
RBC # BLD AUTO: 3.12 MIL/MM3 (ref 4–5.3)
SODIUM SERPL-SCNC: 138 MEQ/L (ref 136–145)
T4 FREE SERPL-MCNC: 1.18 NG/DL (ref 0.76–1.46)
TIBC SERPL-MCNC: 230 MCG/DL (ref 250–450)
TRIGL SERPL-MCNC: 109 MG/DL (ref 42–150)
TROPONIN I SERPL-MCNC: (no result) NG/ML (ref 0.02–0.05)
TROPONIN I SERPL-MCNC: (no result) NG/ML (ref 0.02–0.05)
VIT B12 SERPL-MCNC: (no result) PG/ML (ref 193–986)
WBC # BLD AUTO: 16 TH/MM3 (ref 4–11)

## 2018-03-16 RX ADMIN — AZTREONAM SCH MLS/HR: 1 INJECTION, POWDER, LYOPHILIZED, FOR SOLUTION INTRAMUSCULAR; INTRAVENOUS at 20:00

## 2018-03-16 RX ADMIN — DILTIAZEM HYDROCHLORIDE SCH MG: 120 CAPSULE, EXTENDED RELEASE ORAL at 09:02

## 2018-03-16 RX ADMIN — FUROSEMIDE SCH MG: 10 INJECTION, SOLUTION INTRAMUSCULAR; INTRAVENOUS at 20:59

## 2018-03-16 RX ADMIN — PANTOPRAZOLE SODIUM SCH MG: 20 TABLET, DELAYED RELEASE ORAL at 09:02

## 2018-03-16 RX ADMIN — VALSARTAN SCH MG: 160 TABLET ORAL at 09:03

## 2018-03-16 RX ADMIN — HYDROCHLOROTHIAZIDE SCH MG: 12.5 CAPSULE ORAL at 09:02

## 2018-03-16 RX ADMIN — MAGNESIUM SULFATE IN DEXTROSE SCH MLS/HR: 10 INJECTION, SOLUTION INTRAVENOUS at 15:51

## 2018-03-16 RX ADMIN — AZTREONAM SCH MLS/HR: 1 INJECTION, POWDER, LYOPHILIZED, FOR SOLUTION INTRAMUSCULAR; INTRAVENOUS at 12:05

## 2018-03-16 RX ADMIN — AZTREONAM SCH MLS/HR: 1 INJECTION, POWDER, LYOPHILIZED, FOR SOLUTION INTRAMUSCULAR; INTRAVENOUS at 04:25

## 2018-03-16 RX ADMIN — MAGNESIUM SULFATE IN DEXTROSE SCH MLS/HR: 10 INJECTION, SOLUTION INTRAVENOUS at 14:04

## 2018-03-16 RX ADMIN — Medication SCH ML: at 09:00

## 2018-03-16 RX ADMIN — Medication SCH ML: at 20:59

## 2018-03-16 RX ADMIN — METOPROLOL SUCCINATE SCH MG: 50 TABLET, FILM COATED, EXTENDED RELEASE ORAL at 09:01

## 2018-03-16 NOTE — ECHRPT
Indication:   A FIB FLUTTER

 

 CONCLUSIONS

 Normal left ventricular size. Wall thickness is normal. 

 The left ventricular systolic function is low normal with an estimated ejection fraction 50%.  No de
finite 

 regional wall motion abnormality.

 

 The left atrial size is moderately dilated. 

 The right atrial size is mild-to-moderately dilated. 

 

 Moderate mitral annular calcification is present. 

 Moderate mitral valve regurgitation. 

 

 Trileaflet aortic valve. Mild leaflet calcification. No aortic valve stenosis or regurgitation.  

 

 There is moderate to  severe tricuspid regurgitation. 

 The estimated systolic pulmonary pressure is 75 mm Hg.

 

 A small left sided pleural effusion is noted. 

 

 BP:  175   / 92      HR: 98                       Rhythm:

 

 MEASUREMENTS  (Male / Female) Normal Values       Technical Quality:Good

 2D ECHO

 LV Diastolic Diameter PLAX        4.0 cm                4.2 - 5.9 / 3.9 - 5.3 cm

 LV Systolic Diameter PLAX         3.3 cm                

 IVS Diastolic Thickness           1.6 cm                0.6 - 1.0 / 0.6 - 0.9 cm

 LVPW Diastolic Thickness          0.6 cm                0.6 - 1.0 / 0.6 - 0.9 cm

 LV Relative Wall Thickness        0.6                   

 RV Internal Dim ED PLAX           2.2 cm                

 LA Systolic Diameter LX           4.0 cm                3.0 - 4.0 / 2.7 - 3.8 cm

 

 M-MODE

 Aortic Root Diameter MM           2.9 cm                

 AV Cusp Separation MM             1.7 cm                

 

 DOPPLER

 Mitral E Point Velocity           114.0 cm/s            

 TR Peak Velocity                  430.0 cm/s            

 TR Peak Gradient                  74.0 mmHg             

 Right Atrial Pressure             5.0 mmHg              

 Pulmonary Artery Systolic Pressu  79.0 mmHg             

 Right Ventricular Systolic Press  79.0 mmHg             

 

 

 FINDINGS

 

 LEFT VENTRICLE

 Normal left ventricular size. Wall thickness is normal. 

 The left ventricular systolic function is low normal with an estimated ejection fraction 50%.  No de
finite 

 regional wall motion abnormality.

 

 RIGHT VENTRICLE

 Normal right ventricular size and systolic function.  

 

 LEFT ATRIUM

 The left atrial size is moderately dilated. 

 

 RIGHT ATRIUM

 The right atrial size is mild-to-moderately dilated. 

 

 ATRIAL SEPTUM

 Normal atrial septal thickness without atrial level shunting by limited color doppler interrogation.
  

 

 AORTA

 The aortic root and proximal ascending aorta are normal in size on limited imaging.  

 

 MITRAL VALVE

 Moderate mitral annular calcification is present. 

 Moderate mitral valve regurgitation. 

 

 AORTIC VALVE

 Trileaflet aortic valve. Mild leaflet calcification. No aortic valve stenosis or regurgitation.  

 

 TRICUSPID VALVE

 There is moderate to  severe tricuspid regurgitation. 

 The estimated systolic pulmonary pressure is 75 mm Hg.

 

 PULMONARY VALVE

 No pulmonary valve regurgitation or stenosis.  

 

 VESSELS

 The inferior vena cava is normal in size.  

 

 PERICARDIUM

 A small left sided pleural effusion is noted. 

 

 

 

 

  Bal Clayton MD

  (Electronically Signed)

  Final Date:16 March 2018 15:08

## 2018-03-16 NOTE — MB
cc:

Bal Clayton MD

****

 

 

DATE OF CONSULT:

03/16/2018

 

REASON FOR CONSULTATION:

Atrial fibrillation, severe pulmonary hypertension.

 

HISTORY OF PRESENT ILLNESS:

The patient is an 83-year-old white female, who receives most of her 

medical care up in [place], with a history of chronic atrial 

fibrillation, and hypertension, who presented to the hospital with an 

approximately 4-day history of increasing shortness of breath.  In the

last 3-4 days, she has also felt mildly lightheaded without syncope or

near syncope.  She denies chest pain, palpitations, pedal edema, 

paroxysmal nocturnal dyspnea, orthopnea, and wheezing.  She reports a 

mostly nonproductive cough without definite hemoptysis over the last 

few days.  She also reports a possible viral gastroenteritis about 2 

weeks ago, which has since resolved.  The patient states in the last 

3-4 days she has been barely able to walk 50 yards without 

considerable dyspnea.  Here in the hospital, she was found to be 

anemic.  The patient states she does feel better after transfusion of 

blood.  Transthoracic echo also shows evidence for severe pulmonary 

hypertension with an estimated systolic pulmonary artery pressure of 

70-75 mmHg, with low normal left ventricular systolic function, ejection 
fraction

50%.

 

PAST MEDICAL HISTORY:

1.  Hypertension.

2.  Chronic atrial fibrillation.

 

CARDIAC MEDICATIONS AT HOME:

1.  Warfarin 2.5 mg daily.

2.  Valsartan 320 mg daily.

3.  Metoprolol succinate//12.5 one daily.

4.  Cartia 120 mg daily.

 

ALLERGIES:

PENICILLIN, SULFA, CIPRO.

 

FAMILY HISTORY:

Noncontributory.

 

SOCIAL HISTORY:

The patient denies any history of alcohol or tobacco abuse.

 

REVIEW OF SYSTEMS:

As in the history of present illness, otherwise negative or 

noncontributory.  She also denies headache, abdominal pain, melena, 

and bright red blood per rectum.

 

PHYSICAL EXAMINATION:

VITAL SIGNS:  Blood pressure 167/89 with a pulse of 90, respirations 

18.

GENERAL:  She is a well-developed, well-nourished white female, in no 

acute distress.

NECK:  Jugular venous pressure is 8 cm of water.  Carotid pulses are 

2+ bilaterally and without bruits.

LUNGS:  Examination of the chest reveals diminished breath sounds at 

the bases, left greater than right.

HEART:  On cardiac examination, she has an irregularly irregular 

rhythm without S3 or murmur.

ABDOMEN:  She has a soft, nontender abdomen, bowel sounds are present.

 There is no definite hepatosplenomegaly.

EXTREMITIES:  Reveals no clubbing, cyanosis or edema.

 

DIAGNOSTIC STUDIES:

EKG from 03/15/2018 at 3:39 p.m. shows atrial fibrillation, 

nonspecific ST and T-wave abnormalities.

 

Laboratory data includes potassium 3.5, BUN 16, creatinine 0.53, AST 

237, .  CK 20.  Troponin less than 0.02.  Brain natriuretic 

peptide level 698.  INR 2.2.  WBC 16.0, hemoglobin 10.2 (7.9 on 

admission), platelets 186.

 

Chest x-ray shows bibasilar infiltrates and/or atelectasis.

 

IMPRESSION:

An 83-year-old white female with a history of chronic atrial 

fibrillation, hypertension, admitted with increasing shortness of 

breath and lightheadedness.  The etiology of her symptoms is probably 

multifactorial including anemia, probable severe pulmonary 

hypertension, possibly congestive heart failure.  She has felt 

symptomatically improved with blood transfusion.  



Her echocardiogram  has been reviewed.  The tricuspid regurgitation signal is 
good, so the

measurement of her systolic pulmonary artery pressure of 70-75 mmHg is

probably accurate.  The etiology of her pulmonary hypertension does 

not appear to be cardiac in origin.  Her left ventricular function is 

within normal limits, although at the lower end of normal.  Her mitral

regurgitation does not appear to be severe.  CT angiogram of the chest

reportedly shows no evidence for pulmonary embolus.  



There are small  to moderate bilateral pleural effusions noted and her brain 

natriuretic peptide level is elevated, suggesting congestive heart 

failure.  Her congestive heart failure may have been precipitated by 

anemia.  There is no evidence for acute coronary syndrome.  Cardiac 

enzymes are negative for a myocardial infarction.  EKG shows 

nonspecific ST and T-wave abnormalities.

 

RECOMMENDATIONS:

1.  Mild diuresis.

2.  Await pulmonary consultation.  Consider changing her diltiazem to 

Procardia and/or using sildenafil for her pulmonary hypertension.

3.  Continue oral anticoagulation therapy for her chronic atrial 

fibrillation and severe pulmonary hypertension.

4.  Continued workup for her anemia.

5.  Will follow up periodically.

 

 

__________________________________

MD YOSSI Montes/JOSE

D: 03/16/2018, 06:36 PM

T: 03/16/2018, 07:01 PM

Visit #: [id number]

Job #: [id number]

DMITRY

## 2018-03-16 NOTE — EKG
Date Performed: 03/15/2018       Time Performed: 15:39:34

 

PTAGE:      83 years

 

EKG:      ATRIAL FIBRILLATION WITH ABERRANT CONDUCTION OR VENTRICULAR PREMATURE COMPLEXES NONSPECIFIC
 ST & T-WAVE ABNORMALITY ABNORMAL RHYTHM ECG 

 

NO PREVIOUS TRACING             Cannot rule out ischemia. Clinical correlation recommended.

 

DOCTOR:   Arthur Rodriguez  Interpretating Date/Time  03/16/2018 13:50:22

## 2018-03-16 NOTE — EKG
Date Performed: 03/15/2018       Time Performed: 18:30:44

 

PTAGE:      83 years

 

EKG:      ATRIAL FIBRILLATION WITH RAPID VENTRICULAR RESPONSE NONSPECIFIC T-WAVE ABNORMALITY ABNORMAL
 RHYTHM ECG

 

PREVIOUS TRACING       : 03/15/2018 15.39 Since the previous tracing, no significant change noted

 

DOCTOR:   Arthur Rodriguez  Interpretating Date/Time  03/16/2018 11:10:14

## 2018-03-16 NOTE — HHI.PR
Subjective


Remarks


This is a pleasant 84 y/o Female wit Hypertension, Atrial Fibrillation, 

hemochromatosis, chronic Coumadin intake, Exertional dyspnea


Patient stated that about 3 weeks ago she had a virus in which she had GI 

symptoms such as nausea vomiting.  She stated that resolved 


she started feeling better but a few days ago symptoms recur.  She denies any 

abdominal pain.  Denies any urinary symptoms.  Patient 


then stated that she felt very fatigued and with exertion she felt very short 

of breath.  She denies any chest pain, palpitation, lightheadedness


or dizziness.  





3/16: Stable in her bedroom no complaint, states she had dyspnea related to 

activity, her  by her side, had echocardiogram


EF 50%, normal left ventricular size, wall thickness is normal, moderate mitral 

valve regurgitation, moderate to severe tricuspid 


regurgitation, estimated systolic pulmonary pressure is 75mm Hg. severe 

Pulmonary hypertension, consulted Pulmonary specialist.





Objective





Vital Signs








  Date Time  Temp Pulse Resp B/P (MAP) Pulse Ox O2 Delivery O2 Flow Rate FiO2


 


3/16/18 06:39  98      


 


3/16/18 05:30  98      


 


3/16/18 04:38  84      


 


3/16/18 03:13  83      


 


3/16/18 03:00 98.4 98  175/92 (119) 98   


 


3/16/18 02:51 98.3 106  147/93 100   


 


3/16/18 02:33 98.3 89  145/82 100   


 


3/16/18 02:00  84      


 


3/16/18 01:00  94      


 


3/16/18 00:00  96      


 


3/15/18 23:00 98.4 98  175/92 (119) 98   


 


3/15/18 23:00  114      


 


3/15/18 22:35     98 Nasal Cannula 2.00 


 


3/15/18 22:01 98.4 111  152/80 97   


 


3/15/18 22:00  104      


 


3/15/18 21:39 98.4 117  173/84 97   


 


3/15/18 21:00  104      


 


3/15/18 20:00  102      


 


3/15/18 20:00 97.7 111  180/112 (134) 92   


 


3/15/18 19:00  105      


 


3/15/18 17:09  98 18 176/100 (125) 97 Nasal Cannula 2.00 


 


3/15/18 15:39     (126) 97 Nasal Cannula 2.00 


 


3/15/18 15:03  101 28 198/91 (126) 94 Room Air  


 


3/15/18 14:59  72 27  92 Room Air  


 


3/15/18 14:49 98.2 76 22 172/89 (116) 96   














I/O      


 


 3/15/18 3/15/18 3/15/18 3/16/18 3/16/18 3/16/18





 07:00 15:00 23:00 07:00 15:00 23:00


 


Intake Total   550 ml 1150 ml  


 


Output Total    300 ml  


 


Balance   550 ml 850 ml  


 


      


 


Intake Oral    240 ml  


 


IV Total   500 ml   


 


Packed Cells    800 ml  


 


Blood Product IV Normal Saline Flush   50 ml 110 ml  


 


Output Urine Total    300 ml  








Result Diagram:  


3/16/18 0835                                                                   

             3/15/18 1515





Imaging





Last Impressions








Abdomen/Pelvis CT 3/15/18 1712 Signed





Impressions: 





 Service Date/Time:  Thursday, March 15, 2018 18:09 - CONCLUSION:  1. Air in 

the 





 urinary bladder including some in the right wall of the bladder. This is 





 nonspecific but the differential would include emphysematous cystitis but this 





 is considered somewhat unlikely as I don't see significant wall thickening or 





 high-grade inflammatory changes. A colovesical fistula would also be in the 





 differential. Diverticulosis without definite diverticulitis seen of the 





 adjacent sigmoid colon. Finally, as there is age indeterminate but potentially 





 acute left inferior pubic ramus fracture, the air in the bladder could be 





 posttraumatic. If so, only a small amount of urine has leaked. 2. Suspected 





 venous congestion of the liver. Otherwise, solid organs are within normal 





 limits.     Anderson Weldon MD 


 


CT Angiography 3/15/18 1702 Signed





Impressions: 





 Service Date/Time:  Thursday, March 15, 2018 18:09 - CONCLUSION:  1. No 





 pulmonary embolus. 2. Small to moderate bilateral pleural effusions. 3. Mild 





 dependent/compressive atelectasis of both bases. 4. Mild biatrial enlargement 

of 





 the heart. 5. Coronary artery calcification. 6. Nonspecific mediastinal 





 lymphadenopathy.      Anderson Weldon MD 


 


Head CT 3/15/18 1512 Signed





Impressions: 





 Service Date/Time:  Thursday, March 15, 2018 15:46 - CONCLUSION:  1. Mild 





 periventricular white matter small vessel ischemic changes bilaterally.  2. 

Mild 





 cerebral atrophy.  3. 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 





 ganglia and right cerebellar hemisphere.  4. No acute infarct, acute hemorrhage

, 





 midline shift or extra-axial fluid collections.     Pelon García MD 


 


Chest X-Ray 3/15/18 1512 Signed





Impressions: 





 Service Date/Time:  Thursday, March 15, 2018 15:31 - CONCLUSION:  Bibasilar 





 patchiness consistent with atelectasis and/or infiltrates.     Pelon García MD 








Procedures


None


Other Results





Laboratory Tests








Test


  3/15/18


15:15 3/15/18


20:24 3/16/18


08:35


 


Neutrophils (%) (Auto) 65.1 %   


 


Lymphocytes (%) (Auto) 12.2 %   


 


Monocytes (%) (Auto) 20.0 %   


 


Eosinophils (%) (Auto) 1.1 %   


 


Basophils (%) (Auto) 1.6 %   


 


Neutrophils # (Auto) 12.5 TH/MM3   


 


Lymphocytes # (Auto) 2.3 TH/MM3   


 


Monocytes # (Auto) 3.8 TH/MM3   


 


Eosinophils # (Auto) 0.2 TH/MM3   


 


Basophils # (Auto) 0.3 TH/MM3   


 


CBC Comment AUTO DIFF   


 


Differential Total Cells


Counted 100 


  


  


 


 


Neutrophils % (Manual) 59 %   


 


Band Neutrophils % 12 %   


 


Lymphocytes % 14 %   


 


Monocytes % 13 %   


 


Neutrophils # (Manual) 13.9 TH/MM3   


 


Myelocytes 2 %   


 


Differential Comment


  FINAL DIFF


MANUAL 


  


 


 


Toxic Granulation 1+   


 


Platelet Estimate NORMAL   


 


Platelet Morphology Comment NORMAL   


 


Activated Partial


Thromboplast Time 36.9 SEC 


  


  


 


 


D-Dimer Quantitative (PE/DVT) 0.80 MG/L FEU   


 


Estimat Glomerular Filtration


Rate 80 ML/MIN 


  


  


 


 


Blood Urea Nitrogen 19 MG/DL   


 


Creatinine 0.70 MG/DL   


 


Random Glucose 165 MG/DL   


 


Total Protein 7.8 GM/DL   


 


Albumin 3.1 GM/DL   


 


Calcium Level 8.7 MG/DL   


 


Magnesium Level 1.7 MG/DL   


 


Alkaline Phosphatase 213 U/L   


 


Aspartate Amino Transf


(AST/SGOT) 455 U/L 


  


  


 


 


Alanine Aminotransferase


(ALT/SGPT) 378 U/L 


  


  


 


 


Total Bilirubin 2.7 MG/DL   


 


Sodium Level 137 MEQ/L   


 


Potassium Level 3.9 MEQ/L   


 


Chloride Level 105 MEQ/L   


 


Carbon Dioxide Level 21.2 MEQ/L   


 


B-Type Natriuretic Peptide 698 PG/ML   


 


Hepatitis A IgM Antibody  NONREACTIVE  


 


Hepatitis B Surface Antigen  NONREACTIVE  


 


Hepatitis B Core IgM Antibody  NONREACTIVE  


 


Hepatitis C IgG Antibody  NONREACTIVE  


 


White Blood Count   16.0 TH/MM3 


 


Red Blood Count   3.12 MIL/MM3 


 


Hemoglobin   10.2 GM/DL 


 


Hematocrit   29.7 % 


 


Mean Corpuscular Volume   95.4 FL 


 


Mean Corpuscular Hemoglobin   32.7 PG 


 


Mean Corpuscular Hemoglobin


Concent 


  


  34.3 % 


 


 


Red Cell Distribution Width   18.2 % 


 


Platelet Count   186 TH/MM3 


 


Mean Platelet Volume   10.4 FL 


 


Prothrombin Time   22.1 SEC 


 


Prothromb Time International


Ratio 


  


  2.2 RATIO 


 








Objective Remarks


GENERAL: This is a well-nourished, well-developed patient, in no apparent 

distress.


SKIN: No rashes, ecchymoses or lesions. Cool and dry.


HEAD: Atraumatic. Normocephalic. No temporal or scalp tenderness.


EYES: Pupils equal round and reactive. Extraocular motions intact. No scleral 

icterus. No injection or drainage. 


ENT: Nose without bleeding, purulent drainage or septal hematoma. Throat 

without erythema, tonsillar hypertrophy or exudate. Uvula midline. Airway 

patent.


NECK: Trachea midline. No JVD or lymphadenopathy. Supple, nontender, no 

meningeal signs.


CARDIOVASCULAR: Regular rate and rhythm without murmurs, gallops, or rubs. 


RESPIRATORY: Clear to auscultation. Breath sounds equal bilaterally. No wheezes

, rales, or rhonchi.  


GASTROINTESTINAL: Abdomen soft, non-tender, nondistended. No hepato-splenomegaly

, or palpable masses. No guarding.


MUSCULOSKELETAL: Extremities without clubbing, cyanosis, or edema. No joint 

tenderness, effusion, or edema noted. No calf tenderness. Negative Homans sign 

bilaterally.


NEUROLOGICAL: Awake and alert. Cranial nerves II through XII intact.  Motor and 

sensory grossly within normal limits. Five out of 5 muscle strength in all 

muscle groups.  Normal speech.


Medications and IVs





Current Medications








 Medications


  (Trade)  Dose


 Ordered  Sig/Kellie


 Route  Start Time


 Stop Time Status Last Admin


 


 Sodium Chloride  250 ml @ 


 15 mls/hr  ONCE  ONCE


 IV  3/15/18 17:00


 3/16/18 09:39  3/15/18 17:00


 


 


  (NS Flush)  2 ml  UNSCH  PRN


 IV FLUSH  3/15/18 17:30


     


 


 


  (NS Flush)  2 ml  BID


 IV FLUSH  3/15/18 21:00


    3/16/18 09:00


 


 


  (Tylenol)  650 mg  Q4H  PRN


 PO  3/15/18 17:30


     


 


 


  (Narcan Inj)  0.4 mg  UNSCH  PRN


 IV PUSH  3/15/18 17:30


     


 


 


  (Milk Of


 Magnesia Liq)  30 ml  Q12H  PRN


 PO  3/15/18 17:30


     


 


 


  (Senokot)  17.2 mg  Q12H  PRN


 PO  3/15/18 17:30


     


 


 


  (Lactulose Liq)  30 ml  DAILY  PRN


 PO  3/15/18 17:30


     


 


 


  (Protonix)  20 mg  DAILY


 PO  3/16/18 09:00


    3/16/18 09:02


 


 


  (Cardizem Cd)  120 mg  DAILY


 PO  3/16/18 09:00


    3/16/18 09:02


 


 


  (Premarin)  0.3 mg  DAILY


 PO  3/16/18 09:00


    3/16/18 09:00


 


 


  (Diovan)  320 mg  DAILY


 PO  3/16/18 09:00


    3/16/18 09:03


 


 


  (Coumadin)  2.5 mg  DAILY@1600


 PO  3/16/18 16:00


     


 


 


 Pharmacy Profile


 Note  0 ml @ 0


 mls/hr  UNSCH


 OTHER  3/15/18 17:45


     


 


 


 Aztreonam 1000 mg/


 Sodium Chloride  100 ml @ 


 200 mls/hr  Q8H


 IV  3/15/18 20:00


    3/16/18 04:25


 


 


  (Cardizem)  30 mg  Q6HR  PRN


 PO  3/15/18 18:00


    3/15/18 20:45


 


 


  (Toprol Xl)  100 mg  DAILY


 PO  3/16/18 09:00


    3/16/18 09:01


 


 


  (Microzide)  12.5 mg  DAILY


 PO  3/16/18 09:00


    3/16/18 09:02


 











A/P


Assessment and Plan


This is an 83-year-old female past medical history of atrial fibrillation who 

presented with exertional dyspnea





1. Exertional dyspnea multifactorial continue oxygen. 


2. Atrial Fibrillation with RVR to continue Cardizem, Echocardiogram performed 3

/16: EF 50%, normal left ventricular size, wall thickness is normal, 


    moderate mitral valve regurgitation, moderate to severe tricuspid 

regurgitation, estimated systolic pulmonary pressure is 75mm Hg. severe 

Pulmonary 


    hypertension, consulted Pulmonary specialist. Cardiology consult


    laboratory complete TSH, Free T4, Lipid panel, hemoglobin A1C, Thiamine, 

Vitamin B 12, Folate. CHADS2-VASC score 4 Age, Hypertension, Sex Female


    Needs anticoagulation is on Warfarin INR therapeutic, has Mediastinal 

Lymphadenopathy asked for Pulmonary specialist consult


3. Symptomatic anemia, Hemoglobin 7.9 improved to 10.2 asked for iron, TIBC, 

Ferritin, Hemoccult, improving dyspnea but continue


4. Leukocytosis CXR showed bilateral infiltrates, probable pneumonia, allergic 

to Penicillin on Aztreonam. improving to 16 from 19.1


5. elevated LFTs probable ischemic improving, hepatitis panel negative. 


6. hemochromatosis by history asked for Ferritin levels. 


7. electrolyte derangement Potassium 3.5, magnesium 1.8 and Phosphorus 1.7 

replaced. 








DVT prophylaxis


-Currently on Coumadin.  Continue with Coumadin. INR 2.2 today Pharmacy 

following Warfarin dosages.


Code Status


full


Discussed Condition With





patient and her  in the room.











Pan Cuellar MD Mar 16, 2018 09:21

## 2018-03-16 NOTE — RADRPT
EXAM DATE/TIME:  03/16/2018 09:51 

 

[facility] COMPARISON:     

No previous studies available for comparison.

        

 

 

INDICATIONS :     

Increased lab values.

                     

 

MEDICAL HISTORY :           

Hypertension. A-fib

 

SURGICAL HISTORY :     

Hysterectomy.     

 

ENCOUNTER:     

Subsequent

 

ACUITY:     

1 day

 

PAIN SCORE:     

0/10

 

LOCATION:     

Bilateral upper quadrant 

                     

MEASUREMENTS:     

 

LIVER:     

13.8 cm length 

 

COMMON DUCT:     

4 mm

 

RIGHT KIDNEY:     

10.7 x 4.9 x 5.2 cm

 

SPLEEN:     

10.5 cm length

 

FINDINGS:     

There is limited visualization of the pancreas. The liver, common bile duct, right kidney and spleen 
unremarkable. Multiple gallstones gallbladder sludge but no gallbladder wall thickening. Common bile 
duct normal caliber. Bilateral pleural effusions. Portal vein flowed normal direction.

 

CONCLUSION:     

1. Numerous gallstones with sludge but without biliary ductal dilatation or gallbladder wall thickeni
ng. No free fluid.

 

 

 

 Rogerio Parks MD on March 16, 2018 at 11:34           

Board Certified Radiologist.

 This report was verified electronically.

## 2018-03-17 VITALS
TEMPERATURE: 98.2 F | OXYGEN SATURATION: 96 % | DIASTOLIC BLOOD PRESSURE: 53 MMHG | RESPIRATION RATE: 20 BRPM | SYSTOLIC BLOOD PRESSURE: 102 MMHG | HEART RATE: 114 BPM

## 2018-03-17 VITALS
RESPIRATION RATE: 20 BRPM | OXYGEN SATURATION: 98 % | HEART RATE: 110 BPM | TEMPERATURE: 97.7 F | DIASTOLIC BLOOD PRESSURE: 162 MMHG

## 2018-03-17 VITALS
SYSTOLIC BLOOD PRESSURE: 165 MMHG | RESPIRATION RATE: 20 BRPM | DIASTOLIC BLOOD PRESSURE: 99 MMHG | TEMPERATURE: 98.1 F | HEART RATE: 106 BPM | OXYGEN SATURATION: 96 %

## 2018-03-17 VITALS
OXYGEN SATURATION: 94 % | TEMPERATURE: 92 F | SYSTOLIC BLOOD PRESSURE: 175 MMHG | DIASTOLIC BLOOD PRESSURE: 86 MMHG | HEART RATE: 110 BPM | RESPIRATION RATE: 22 BRPM

## 2018-03-17 VITALS
OXYGEN SATURATION: 97 % | HEART RATE: 110 BPM | SYSTOLIC BLOOD PRESSURE: 162 MMHG | RESPIRATION RATE: 20 BRPM | TEMPERATURE: 98.3 F | DIASTOLIC BLOOD PRESSURE: 54 MMHG

## 2018-03-17 VITALS
TEMPERATURE: 98.1 F | HEART RATE: 95 BPM | DIASTOLIC BLOOD PRESSURE: 86 MMHG | SYSTOLIC BLOOD PRESSURE: 174 MMHG | OXYGEN SATURATION: 95 % | RESPIRATION RATE: 24 BRPM

## 2018-03-17 VITALS — HEART RATE: 98 BPM

## 2018-03-17 VITALS — HEART RATE: 85 BPM

## 2018-03-17 VITALS — HEART RATE: 103 BPM

## 2018-03-17 LAB
COLOR UR: YELLOW
GLUCOSE UR STRIP-MCNC: (no result) MG/DL
HGB UR QL STRIP: (no result)
INR PPP: 2.5 RATIO
KETONES UR STRIP-MCNC: (no result) MG/DL
MUCOUS THREADS #/AREA URNS LPF: (no result) /LPF
NITRITE UR QL STRIP: (no result)
PROTHROMBIN TIME: 25.6 SEC (ref 9.8–11.6)
SP GR UR STRIP: 1.01 (ref 1–1.03)
SQUAMOUS #/AREA URNS HPF: <1 /HPF (ref 0–5)
URINE LEUKOCYTE ESTERASE: (no result)

## 2018-03-17 RX ADMIN — AZTREONAM SCH MLS/HR: 1 INJECTION, POWDER, LYOPHILIZED, FOR SOLUTION INTRAMUSCULAR; INTRAVENOUS at 21:42

## 2018-03-17 RX ADMIN — Medication SCH ML: at 21:43

## 2018-03-17 RX ADMIN — VALSARTAN SCH MG: 160 TABLET ORAL at 09:12

## 2018-03-17 RX ADMIN — AZTREONAM SCH MLS/HR: 1 INJECTION, POWDER, LYOPHILIZED, FOR SOLUTION INTRAMUSCULAR; INTRAVENOUS at 03:16

## 2018-03-17 RX ADMIN — METOPROLOL SUCCINATE SCH MG: 50 TABLET, FILM COATED, EXTENDED RELEASE ORAL at 09:12

## 2018-03-17 RX ADMIN — AZTREONAM SCH MLS/HR: 1 INJECTION, POWDER, LYOPHILIZED, FOR SOLUTION INTRAMUSCULAR; INTRAVENOUS at 11:40

## 2018-03-17 RX ADMIN — WARFARIN SODIUM SCH MG: 2.5 TABLET ORAL at 17:37

## 2018-03-17 RX ADMIN — DILTIAZEM HYDROCHLORIDE SCH MG: 120 CAPSULE, EXTENDED RELEASE ORAL at 09:12

## 2018-03-17 RX ADMIN — FUROSEMIDE SCH MG: 10 INJECTION, SOLUTION INTRAMUSCULAR; INTRAVENOUS at 09:12

## 2018-03-17 RX ADMIN — HYDROCHLOROTHIAZIDE SCH MG: 12.5 CAPSULE ORAL at 09:12

## 2018-03-17 RX ADMIN — PANTOPRAZOLE SODIUM SCH MG: 20 TABLET, DELAYED RELEASE ORAL at 09:12

## 2018-03-17 RX ADMIN — FUROSEMIDE SCH MG: 10 INJECTION, SOLUTION INTRAMUSCULAR; INTRAVENOUS at 21:42

## 2018-03-17 RX ADMIN — Medication SCH ML: at 09:12

## 2018-03-17 NOTE — HHI.PR
Subjective


Remarks


The patient states that her respiratory status is improving.


Patient still feels shortness of breath upon exertion however these has 

improved greatly after the point that she was able to get out of the bed 

without assistance and go to the bathroom.


The patient denies fevers or chills.


The patient states she still coughing.


Patient is afebrile.





Objective


Vitals





Vital Signs








  Date Time  Temp Pulse Resp B/P (MAP) Pulse Ox O2 Delivery O2 Flow Rate FiO2


 


3/17/18 08:00 92.0 110 22 175/86 (115) 94   


 


3/17/18 04:00 97.7 91 20 /162 98   


 


3/17/18 04:00  110      


 


3/17/18 00:00 98.1 81 24 174/86 (115) 95   


 


3/17/18 00:00  95      


 


3/17/18 00:00      Nasal Cannula 2.00 


 


3/16/18 20:00  110      


 


3/16/18 20:00 98.3 99 20 160/85 (110) 96   


 


3/16/18 20:00      Nasal Cannula 2.00 


 


3/16/18 16:00 97.9 20 18 167/89 (115) 96   


 


3/16/18 15:00  96      


 


3/16/18 15:00     94 Nasal Cannula 2.50 


 


3/16/18 13:00  100      


 


3/16/18 12:00  108      


 


3/16/18 11:00     94 Nasal Cannula 2.50 


 


3/16/18 11:00 98.5 108 22 152/92 (112) 94   


 


3/16/18 11:00  97      


 


3/16/18 10:45     94 Nasal Cannula 2.00 














I/O      


 


 3/16/18 3/16/18 3/16/18 3/17/18 3/17/18 3/17/18





 07:00 15:00 23:00 07:00 15:00 23:00


 


Intake Total 1150 ml  410 ml 707 ml  


 


Output Total 300 ml     


 


Balance 850 ml  410 ml 707 ml  


 


      


 


Intake Oral 240 ml  310 ml 550 ml  


 


IV Total   100 ml 157 ml  


 


Packed Cells 800 ml     


 


Blood Product IV Normal Saline Flush 110 ml     


 


Output Urine Total 300 ml     


 


# Voids   1 1  


 


# Bowel Movements   0 1  








Result Diagram:  


3/16/18 0835                                                                   

             3/16/18 0835





Imaging





Last Impressions








Liver Ultrasound 3/16/18 0000 Signed





Impressions: 





 Service Date/Time:  Friday, March 16, 2018 09:51 - CONCLUSION:  1. Numerous 





 gallstones with sludge but without biliary ductal dilatation or gallbladder 

wall 





 thickening. No free fluid.     Rogerio Parks MD 


 


Abdomen/Pelvis CT 3/15/18 1712 Signed





Impressions: 





 Service Date/Time:  Thursday, March 15, 2018 18:09 - CONCLUSION:  1. Air in 

the 





 urinary bladder including some in the right wall of the bladder. This is 





 nonspecific but the differential would include emphysematous cystitis but this 





 is considered somewhat unlikely as I don't see significant wall thickening or 





 high-grade inflammatory changes. A colovesical fistula would also be in the 





 differential. Diverticulosis without definite diverticulitis seen of the 





 adjacent sigmoid colon. Finally, as there is age indeterminate but potentially 





 acute left inferior pubic ramus fracture, the air in the bladder could be 





 posttraumatic. If so, only a small amount of urine has leaked. 2. Suspected 





 venous congestion of the liver. Otherwise, solid organs are within normal 





 limits.     Anderson Weldon MD 


 


CT Angiography 3/15/18 1702 Signed





Impressions: 





 Service Date/Time:  Thursday, March 15, 2018 18:09 - CONCLUSION:  1. No 





 pulmonary embolus. 2. Small to moderate bilateral pleural effusions. 3. Mild 





 dependent/compressive atelectasis of both bases. 4. Mild biatrial enlargement 

of 





 the heart. 5. Coronary artery calcification. 6. Nonspecific mediastinal 





 lymphadenopathy.      Anderson Weldon MD 


 


Head CT 3/15/18 1512 Signed





Impressions: 





 Service Date/Time:  Thursday, March 15, 2018 15:46 - CONCLUSION:  1. Mild 





 periventricular white matter small vessel ischemic changes bilaterally.  2. 

Mild 





 cerebral atrophy.  3. 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 





 ganglia and right cerebellar hemisphere.  4. No acute infarct, acute hemorrhage

, 





 midline shift or extra-axial fluid collections.     Pelon García MD 


 


Chest X-Ray 3/15/18 1512 Signed





Impressions: 





 Service Date/Time:  Thursday, March 15, 2018 15:31 - CONCLUSION:  Bibasilar 





 patchiness consistent with atelectasis and/or infiltrates.     Pelon García MD 








Objective Remarks


Awake and alert and oriented 3.





Acute distress.


Breathing is nonlabored.


S1-S2 Irregular rate or rythm


Abdomen is soft, nt, nd


no edema in lower extremities


Medications and IVs





Current Medications








 Medications


  (Trade)  Dose


 Ordered  Sig/Kellie


 Route  Start Time


 Stop Time Status Last Admin


 


  (NS Flush)  2 ml  UNSCH  PRN


 IV FLUSH  3/15/18 17:30


     


 


 


  (NS Flush)  2 ml  BID


 IV FLUSH  3/15/18 21:00


    3/17/18 09:12


 


 


  (Tylenol)  650 mg  Q4H  PRN


 PO  3/15/18 17:30


     


 


 


  (Narcan Inj)  0.4 mg  UNSCH  PRN


 IV PUSH  3/15/18 17:30


     


 


 


  (Milk Of


 Magnesia Liq)  30 ml  Q12H  PRN


 PO  3/15/18 17:30


     


 


 


  (Senokot)  17.2 mg  Q12H  PRN


 PO  3/15/18 17:30


     


 


 


  (Lactulose Liq)  30 ml  DAILY  PRN


 PO  3/15/18 17:30


     


 


 


  (Protonix)  20 mg  DAILY


 PO  3/16/18 09:00


    3/17/18 09:12


 


 


  (Cardizem Cd)  120 mg  DAILY


 PO  3/16/18 09:00


    3/17/18 09:12


 


 


  (Premarin)  0.3 mg  DAILY


 PO  3/16/18 09:00


   Future Hold 3/16/18 09:00


 


 


  (Diovan)  320 mg  DAILY


 PO  3/16/18 09:00


    3/17/18 09:12


 


 


  (Coumadin)  2.5 mg  DAILY@1600


 PO  3/17/18 16:00


     


 


 


 Pharmacy Profile


 Note  0 ml @ 0


 mls/hr  UNSCH


 OTHER  3/15/18 17:45


     


 


 


 Aztreonam 1000 mg/


 Sodium Chloride  100 ml @ 


 200 mls/hr  Q8H


 IV  3/15/18 20:00


    3/17/18 03:16


 


 


  (Cardizem)  30 mg  Q6HR  PRN


 PO  3/15/18 18:00


    3/15/18 20:45


 


 


  (Toprol Xl)  100 mg  DAILY


 PO  3/16/18 09:00


    3/17/18 09:12


 


 


  (Microzide)  12.5 mg  DAILY


 PO  3/16/18 09:00


    3/17/18 09:12


 


 


  (Lasix Inj)  20 mg  BID


 IV PUSH  3/16/18 21:00


    3/17/18 09:12


 








Urinary Catheter:  No


Vascular Central Line Catheter:  No





A/P


Problem List:  


(1) Exertional dyspnea


ICD Code:  R06.09 - Other forms of dyspnea


Plan:  CTA negative for PE.  Small to moderate bilateral pleural effusions.  

Mild dependent/compressive atelectasis at both bases.  Mild biatrial 

enlargement of the heart.


Likely multifactorial etiology from atrial fibrillation with RVR, symptomatic 

anemia and acute diastolic heart failure.


Echocardiogram shows EF of 50%, no definite regional wall motion abnormality, 

left atrial size is moderately dilated, right atrial size is mild to moderately 

dilated, moderate mitral annular calcification present, moderate mitral valve 

regurgitation, trileaflet aortic valve but no stenosis or regurgitation.  

Moderate to severe tricuspid regurgitation.  Pulmonary hypertension.


Continue supplemental oxygen to keep oxygen saturation more than 92%.





(2) Atrial fibrillation with RVR


ICD Code:  I48.91 - Unspecified atrial fibrillation


Plan:  Patient presented with heart rate between 80 and 150 bpm.


Continue home medications.


On Coumadin with therapeutic INR.





(3) Symptomatic anemia


ICD Code:  D64.9 - Anemia, unspecified


Status:  Acute


Plan:  Patient has history of chronic anemia however as per patient report last 

hemoglobin was 11.


Status post transfusion of 2 units of packed red blood cells with appropriate 

hemoglobin response from 7.9-10.2.


Iron studies consistent with hemochromatosis with increased iron, low TIBC, 

increased percent saturation of iron, elevated ferritin.


Check stool for Hemoccult blood.





(4) Chronic anticoagulation


ICD Code:  Z79.01 - Long term (current) use of anticoagulants


Plan:  INR therapeutic.


Continue to monitor PT and INR daily.


Continue Coumadin.





(5) Transaminitis


ICD Code:  R74.0 - Nonspecific elevation of levels of transaminase and lactic 

acid dehydrogenase [LDH]


Plan:  Liver ultrasound shows numerous gallstones with sludge but without 

biliary ductal dilatation or gallbladder wall thickening.





(6) Hypertension


ICD Code:  I10 - Essential (primary) hypertension


Plan:  Continue valsartan, metoprolol succinate, hydrochlorothiazide.


Blood pressure elevated this am - as per Rn last reading with an sbp of 102


Will continue same dose of antihypertensive medications. 





(7) Leukocytosis


ICD Code:  D72.829 - Elevated white blood cell count, unspecified


Status:  Acute


Plan:   with bands of 12%.


Check urinalysis.


WBCs trending down, continue to monitor.


Currently on IV Aztreonam - unclear what infections is being treated.





(8) Abnormal CT scan, bladder


ICD Code:  R93.41 - Abnormal radiologic findings on diagnostic imaging of renal 

pelvis, ureter, or bladder


Plan:  CT of the abdomen and pelvis describes air in the urinary bladder.  

Differential diagnosis includes for similar cystitis or colovesicular fistula.


Consult urology.





(9) Pulmonary hypertension


ICD Code:  I27.20 - Pulmonary hypertension, unspecified


Plan:  As seen on echocardiogram described above.


Etiology consulted.


Continue diuresis.


Await pulmonary consultation.





(10) Acute diastolic heart failure


ICD Code:  I50.31 - Acute diastolic (congestive) heart failure


Status:  Acute


Plan:  BL pleural effusions on CTA


Pulm htn


echo w ef 50 %


Continur furosemide





Assessment and Plan


DVT prophylaxis: on coumadin


Discharge Planning


pending urology consult and clinical improvement.





Problem Qualifiers





(1) Hypertension:  


Qualified Codes:  I10 - Essential (primary) hypertension


(2) Leukocytosis:  


Qualified Codes:  D72.825 - Bandemia








Cem Gordon MD Mar 17, 2018 11:01

## 2018-03-17 NOTE — PD.CONS
HPI


Service


Urology


Consult Requested By


Dr. Jw Soriano


Reason for Consult


Air within urinary bladder


Primary Care Physician


No Primary Care Physician


Diagnosis:  


(1) Exertional dyspnea


ICD Code:  R06.09 - Other forms of dyspnea


(2) Atrial fibrillation with RVR


ICD Code:  I48.91 - Unspecified atrial fibrillation


(3) Symptomatic anemia


ICD Code:  D64.9 - Anemia, unspecified


(4) Chronic anticoagulation


ICD Code:  Z79.01 - Long term (current) use of anticoagulants


(5) Transaminitis


ICD Code:  R74.0 - Nonspecific elevation of levels of transaminase and lactic 

acid dehydrogenase [LDH]


(6) Hypertension


ICD Code:  I10 - Essential (primary) hypertension


(7) Leukocytosis


ICD Code:  D72.829 - Elevated white blood cell count, unspecified


(8) Abnormal CT scan, bladder


ICD Code:  R93.41 - Abnormal radiologic findings on diagnostic imaging of renal 

pelvis, ureter, or bladder


(9) Pulmonary hypertension


ICD Code:  I27.20 - Pulmonary hypertension, unspecified


History of Present Illness


83-year-old female with history atrial fibrillation on anticoagulation therapy 

who was admitted for further workup and management of dyspnea.  During the 

course of her present hospitalization a CT scan of the abdomen and pelvis was 

performed.  Urologic findings included air within the urinary bladder as well 

as a small amount of air within the right bladder wall of indeterminate 

etiology.  There was adjacent sigmoid diverticulosis noted.  No inflammatory 

changes were appreciated.  There was no pelvic lymphadenopathy.  Upon further 

questioning, the patient denies any problems urinating.  She denies dysuria or 

hematuria.  She denies a history of recurrent urinary tract infections.  She 

denies being recently catheterized.





Review of Systems


Constitutional:  DENIES: Fever, Chills


Respiratory:  COMPLAINS OF: Shortness of breath


Cardiovascular:  DENIES: Chest pain


Gastrointestinal:  DENIES: Abdominal pain


Genitourinary:  DENIES: Urgency, Hematuria, Dysuria


Except as stated in HPI:  all other systems reviewed are Neg





Past Family Social History


Past Medical History


Atrial fibrillation on chronic anticoagulation therapy


Hypertension


Hemochromatosis


Past Surgical History


Status post hip replacement surgery


Status post hysterectomy secondary to fibroids


Status post lower extremity vein stripping


Reported Medications


Refer to EMR


Allergies:  


Coded Allergies:  


     Penicillins (Verified  Allergy, Severe, vomiting, 3/15/18)


     Sulfa (Sulfonamide Antibiotics) (Verified  Allergy, Severe, vomiting, 3/15/

18)


     ciprofloxacin (Verified  Allergy, Severe, vomiting, 3/15/18)


Active Ordered Medications


Refer to EMR


Family History


Brother with history leukemia


Mother with history lymphoma


Social History


Consumes 2 alcoholic beverages daily


Denies history of tobacco or intravenous drug abuse





Physical Exam





Vital Signs








  Date Time  Temp Pulse Resp B/P (MAP) Pulse Ox O2 Delivery O2 Flow Rate FiO2


 


3/17/18 08:00  89      


 


3/17/18 08:00      Nasal Cannula 2.00 


 


3/17/18 08:00 92.0 110 22 175/86 (115) 94   


 


3/17/18 04:00 97.7 91 20 /162 98   


 


3/17/18 04:00  110      


 


3/17/18 00:00 98.1 81 24 174/86 (115) 95   


 


3/17/18 00:00  95      


 


3/17/18 00:00      Nasal Cannula 2.00 


 


3/16/18 20:00  110      


 


3/16/18 20:00 98.3 99 20 160/85 (110) 96   


 


3/16/18 20:00      Nasal Cannula 2.00 


 


3/16/18 16:00 97.9 20 18 167/89 (115) 96   


 


3/16/18 15:00  96      


 


3/16/18 15:00     94 Nasal Cannula 2.50 


 


3/16/18 13:00  100      








Physical Exam


GENERAL: This is a well-nourished, well-developed patient, in no apparent 

distress.


SKIN: No rashes, ecchymoses or lesions. Cool and dry.


HEAD: Atraumatic. Normocephalic. No temporal or scalp tenderness.


EYES: Pupils equal round and reactive. Extraocular motions intact. No scleral 

icterus. No injection or drainage. 


ENT: Nose without bleeding, purulent drainage or septal hematoma. Throat 

without erythema, tonsillar hypertrophy or exudate. Uvula midline. Airway 

patent.


NECK: Trachea midline. No JVD or lymphadenopathy. Supple, nontender, no 

meningeal signs.


GASTROINTESTINAL: Abdomen soft, non-tender, nondistended. No hepato-splenomegaly

, or palpable masses. No guarding.


GENITOURINARY: Bladder not distended


MUSCULOSKELETAL: Extremities without clubbing, cyanosis, or edema. No joint 

tenderness, effusion, or edema noted. No calf tenderness. Negative Homans sign 

bilaterally.


NEUROLOGICAL: Awake and alert. Cranial nerves II through XII intact.  Motor and 

sensory grossly within normal limits.  Normal speech.


Lab results reviewed:  Yes





Laboratory Tests








Test


  3/17/18


06:07


 


Prothrombin Time 25.6 


 


Prothromb Time International


Ratio 2.5 


 








Result Diagram:  


3/16/18 0835                                                                   

             3/16/18 0835





Personally reviewed images:  Yes


Imaging





Last Impressions








Liver Ultrasound 3/16/18 0000 Signed





Impressions: 





 Service Date/Time:  Friday, March 16, 2018 09:51 - CONCLUSION:  1. Numerous 





 gallstones with sludge but without biliary ductal dilatation or gallbladder 

wall 





 thickening. No free fluid.     Rogerio Parks MD 


 


Abdomen/Pelvis CT 3/15/18 1712 Signed





Impressions: 





 Service Date/Time:  Thursday, March 15, 2018 18:09 - CONCLUSION:  1. Air in 

the 





 urinary bladder including some in the right wall of the bladder. This is 





 nonspecific but the differential would include emphysematous cystitis but this 





 is considered somewhat unlikely as I don't see significant wall thickening or 





 high-grade inflammatory changes. A colovesical fistula would also be in the 





 differential. Diverticulosis without definite diverticulitis seen of the 





 adjacent sigmoid colon. Finally, as there is age indeterminate but potentially 





 acute left inferior pubic ramus fracture, the air in the bladder could be 





 posttraumatic. If so, only a small amount of urine has leaked. 2. Suspected 





 venous congestion of the liver. Otherwise, solid organs are within normal 





 limits.     Anderson Weldon MD 


 


CT Angiography 3/15/18 1702 Signed





Impressions: 





 Service Date/Time:  Thursday, March 15, 2018 18:09 - CONCLUSION:  1. No 





 pulmonary embolus. 2. Small to moderate bilateral pleural effusions. 3. Mild 





 dependent/compressive atelectasis of both bases. 4. Mild biatrial enlargement 

of 





 the heart. 5. Coronary artery calcification. 6. Nonspecific mediastinal 





 lymphadenopathy.      Anderson Weldon MD 


 


Head CT 3/15/18 1512 Signed





Impressions: 





 Service Date/Time:  Thursday, March 15, 2018 15:46 - CONCLUSION:  1. Mild 





 periventricular white matter small vessel ischemic changes bilaterally.  2. 

Mild 





 cerebral atrophy.  3. 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 





 ganglia and right cerebellar hemisphere.  4. No acute infarct, acute hemorrhage

, 





 midline shift or extra-axial fluid collections.     Pelon aGrcía MD 


 


Chest X-Ray 3/15/18 1512 Signed





Impressions: 





 Service Date/Time:  Thursday, March 15, 2018 15:31 - CONCLUSION:  Bibasilar 





 patchiness consistent with atelectasis and/or infiltrates.     Pelon García MD 











Assessment and Plan


Assessment and Plan


Urologic impression: Incidental finding of air within the urinary bladder of 

indeterminate etiology.  May possibly be related to colovesical fistula 

formation.





Recommendations:


1.  Check a urinalysis


2.  We will further workup as outpatient once overall medical condition 

stabilized


3.  Patient advised to contact my office after hospital discharge to arrange 

follow-up





Problem Qualifiers





(1) Hypertension:  


Qualified Codes:  I10 - Essential (primary) hypertension


(2) Leukocytosis:  


Qualified Codes:  D72.825 - Bandemia








Armin Meyer MD Mar 17, 2018 12:51

## 2018-03-18 VITALS
TEMPERATURE: 97.8 F | OXYGEN SATURATION: 98 % | DIASTOLIC BLOOD PRESSURE: 85 MMHG | RESPIRATION RATE: 19 BRPM | HEART RATE: 94 BPM | SYSTOLIC BLOOD PRESSURE: 152 MMHG

## 2018-03-18 VITALS
RESPIRATION RATE: 20 BRPM | HEART RATE: 77 BPM | SYSTOLIC BLOOD PRESSURE: 91 MMHG | DIASTOLIC BLOOD PRESSURE: 50 MMHG | OXYGEN SATURATION: 98 % | TEMPERATURE: 98.6 F

## 2018-03-18 VITALS
RESPIRATION RATE: 18 BRPM | OXYGEN SATURATION: 96 % | TEMPERATURE: 99.1 F | SYSTOLIC BLOOD PRESSURE: 142 MMHG | HEART RATE: 90 BPM | DIASTOLIC BLOOD PRESSURE: 80 MMHG

## 2018-03-18 VITALS
RESPIRATION RATE: 20 BRPM | TEMPERATURE: 98.5 F | SYSTOLIC BLOOD PRESSURE: 156 MMHG | OXYGEN SATURATION: 95 % | HEART RATE: 91 BPM | DIASTOLIC BLOOD PRESSURE: 82 MMHG

## 2018-03-18 VITALS
DIASTOLIC BLOOD PRESSURE: 94 MMHG | OXYGEN SATURATION: 93 % | SYSTOLIC BLOOD PRESSURE: 162 MMHG | HEART RATE: 92 BPM | TEMPERATURE: 98.9 F | RESPIRATION RATE: 18 BRPM

## 2018-03-18 VITALS — HEART RATE: 90 BPM

## 2018-03-18 VITALS
OXYGEN SATURATION: 98 % | TEMPERATURE: 98.6 F | HEART RATE: 78 BPM | SYSTOLIC BLOOD PRESSURE: 144 MMHG | DIASTOLIC BLOOD PRESSURE: 67 MMHG | RESPIRATION RATE: 20 BRPM

## 2018-03-18 VITALS — HEART RATE: 99 BPM

## 2018-03-18 VITALS — HEART RATE: 81 BPM

## 2018-03-18 LAB
ALBUMIN SERPL-MCNC: 2.7 GM/DL (ref 3.4–5)
ALP SERPL-CCNC: 172 U/L (ref 45–117)
ALT SERPL-CCNC: 125 U/L (ref 10–53)
AST SERPL-CCNC: 53 U/L (ref 15–37)
BILIRUB SERPL-MCNC: 3 MG/DL (ref 0.2–1)
BUN SERPL-MCNC: 11 MG/DL (ref 7–18)
CALCIUM SERPL-MCNC: 8.1 MG/DL (ref 8.5–10.1)
CHLORIDE SERPL-SCNC: 101 MEQ/L (ref 98–107)
CREAT SERPL-MCNC: 0.59 MG/DL (ref 0.5–1)
ERYTHROCYTE [DISTWIDTH] IN BLOOD BY AUTOMATED COUNT: 17.8 % (ref 11.6–17.2)
GFR SERPLBLD BASED ON 1.73 SQ M-ARVRAT: 98 ML/MIN (ref 89–?)
GLUCOSE SERPL-MCNC: 80 MG/DL (ref 74–106)
HCO3 BLD-SCNC: 25.6 MEQ/L (ref 21–32)
HCT VFR BLD CALC: 30.4 % (ref 35–46)
HGB BLD-MCNC: 10.3 GM/DL (ref 11.6–15.3)
INR PPP: 2.1 RATIO
LYMPHOCYTES: 11 % (ref 9–44)
MAGNESIUM SERPL-MCNC: 1.6 MG/DL (ref 1.5–2.5)
MCH RBC QN AUTO: 32.2 PG (ref 27–34)
MCHC RBC AUTO-ENTMCNC: 33.9 % (ref 32–36)
MCV RBC AUTO: 95.1 FL (ref 80–100)
METAMYELOCYTES NFR BLD: 3 % (ref 0–1)
MONOCYTES: 19 % (ref 0–8)
MYELOCYTES NFR BLD: 2 % (ref 0–0)
NEUTS BAND # BLD MANUAL: 14.8 TH/MM3 (ref 1.8–7.7)
NEUTS BAND NFR BLD: 6 % (ref 0–6)
NEUTS SEG NFR BLD MANUAL: 59 % (ref 16–70)
PHOSPHATE SERPL-MCNC: 1.7 MG/DL (ref 2.5–4.9)
PLATELET # BLD: 150 TH/MM3 (ref 150–450)
PMV BLD AUTO: 10.1 FL (ref 7–11)
PROT SERPL-MCNC: 6.9 GM/DL (ref 6.4–8.2)
PROTHROMBIN TIME: 21.5 SEC (ref 9.8–11.6)
RBC # BLD AUTO: 3.2 MIL/MM3 (ref 4–5.3)
SODIUM SERPL-SCNC: 137 MEQ/L (ref 136–145)
WBC # BLD AUTO: 21.1 TH/MM3 (ref 4–11)

## 2018-03-18 RX ADMIN — SODIUM PHOSPHATE, DIBASIC, ANHYDROUS, POTASSIUM PHOSPHATE, MONOBASIC, AND SODIUM PHOSPHATE, MONOBASIC, MONOHYDRATE SCH MG: 852; 155; 130 TABLET, COATED ORAL at 21:03

## 2018-03-18 RX ADMIN — VALSARTAN SCH MG: 160 TABLET ORAL at 08:39

## 2018-03-18 RX ADMIN — FUROSEMIDE SCH MG: 10 INJECTION, SOLUTION INTRAMUSCULAR; INTRAVENOUS at 08:39

## 2018-03-18 RX ADMIN — SODIUM PHOSPHATE, DIBASIC, ANHYDROUS, POTASSIUM PHOSPHATE, MONOBASIC, AND SODIUM PHOSPHATE, MONOBASIC, MONOHYDRATE SCH MG: 852; 155; 130 TABLET, COATED ORAL at 14:24

## 2018-03-18 RX ADMIN — AZTREONAM SCH MLS/HR: 1 INJECTION, POWDER, LYOPHILIZED, FOR SOLUTION INTRAMUSCULAR; INTRAVENOUS at 12:11

## 2018-03-18 RX ADMIN — HYDROCHLOROTHIAZIDE SCH MG: 12.5 CAPSULE ORAL at 08:40

## 2018-03-18 RX ADMIN — MAGNESIUM SULFATE IN DEXTROSE SCH MLS/HR: 10 INJECTION, SOLUTION INTRAVENOUS at 14:25

## 2018-03-18 RX ADMIN — PANTOPRAZOLE SODIUM SCH MG: 20 TABLET, DELAYED RELEASE ORAL at 08:39

## 2018-03-18 RX ADMIN — MAGNESIUM SULFATE IN DEXTROSE SCH MLS/HR: 10 INJECTION, SOLUTION INTRAVENOUS at 13:24

## 2018-03-18 RX ADMIN — WARFARIN SODIUM SCH MG: 2.5 TABLET ORAL at 16:51

## 2018-03-18 RX ADMIN — POTASSIUM CHLORIDE SCH MLS/HR: 200 INJECTION, SOLUTION INTRAVENOUS at 15:20

## 2018-03-18 RX ADMIN — FUROSEMIDE SCH MG: 10 INJECTION, SOLUTION INTRAMUSCULAR; INTRAVENOUS at 16:51

## 2018-03-18 RX ADMIN — METOPROLOL SUCCINATE SCH MG: 50 TABLET, FILM COATED, EXTENDED RELEASE ORAL at 08:39

## 2018-03-18 RX ADMIN — Medication SCH ML: at 08:40

## 2018-03-18 RX ADMIN — AZTREONAM SCH MLS/HR: 1 INJECTION, POWDER, LYOPHILIZED, FOR SOLUTION INTRAMUSCULAR; INTRAVENOUS at 21:03

## 2018-03-18 RX ADMIN — POTASSIUM CHLORIDE SCH MLS/HR: 200 INJECTION, SOLUTION INTRAVENOUS at 19:39

## 2018-03-18 RX ADMIN — Medication SCH ML: at 21:00

## 2018-03-18 RX ADMIN — AZTREONAM SCH MLS/HR: 1 INJECTION, POWDER, LYOPHILIZED, FOR SOLUTION INTRAMUSCULAR; INTRAVENOUS at 04:37

## 2018-03-18 RX ADMIN — FUROSEMIDE SCH MG: 10 INJECTION, SOLUTION INTRAMUSCULAR; INTRAVENOUS at 16:53

## 2018-03-18 NOTE — PD.CARD.PN
Subjective


Subjective Remarks


Dyspnea much better.   No dizziness, CP, palpitations, PND.  Nonproductive 

cough also much better.





Objective


Medications











Item Value  Date Time


 


Diltiazem HCl 180 mg 3/18/18 0900





 (Cardizem Cd) DAILY/PO 3/18/18 0839


 


Warfarin Sodium 2.5 mg 3/17/18 1600





 (Coumadin) DAILY@1600/PO 3/17/18 1737


 


Furosemide 20 mg 3/16/18 2100





 (Lasix Inj) BID/IV PUSH 3/18/18 0839


 


Valsartan 320 mg 3/16/18 0900





 (Diovan) DAILY/PO 3/18/18 0839


 


Metoprolol 100 mg 3/16/18 0900





Succinate DAILY/PO 3/18/18 0839





 (Toprol Xl)  


 


Hydrochlorothiazide 12.5 mg 3/16/18 0900





 (Microzide) DAILY/PO 3/18/18 0840











Current Medications








 Medications


  (Trade)  Dose


 Ordered  Sig/Kellie


 Route  Start Time


 Stop Time Status Last Admin


 


  (NS Flush)  2 ml  UNSCH  PRN


 IV FLUSH  3/15/18 17:30


     


 


 


  (NS Flush)  2 ml  BID


 IV FLUSH  3/15/18 21:00


    3/18/18 08:40


 


 


  (Tylenol)  650 mg  Q4H  PRN


 PO  3/15/18 17:30


     


 


 


  (Narcan Inj)  0.4 mg  UNSCH  PRN


 IV PUSH  3/15/18 17:30


     


 


 


  (Milk Of


 Magnesia Liq)  30 ml  Q12H  PRN


 PO  3/15/18 17:30


     


 


 


  (Senokot)  17.2 mg  Q12H  PRN


 PO  3/15/18 17:30


     


 


 


  (Lactulose Liq)  30 ml  DAILY  PRN


 PO  3/15/18 17:30


     


 


 


  (Protonix)  20 mg  DAILY


 PO  3/16/18 09:00


    3/18/18 08:39


 


 


  (Premarin)  0.3 mg  DAILY


 PO  3/16/18 09:00


   Future Hold 3/16/18 09:00


 


 


  (Diovan)  320 mg  DAILY


 PO  3/16/18 09:00


    3/18/18 08:39


 


 


  (Coumadin)  2.5 mg  DAILY@1600


 PO  3/17/18 16:00


    3/17/18 17:37


 


 


 Pharmacy Profile


 Note  0 ml @ 0


 mls/hr  UNSCH


 OTHER  3/15/18 17:45


     


 


 


 Aztreonam 1000 mg/


 Sodium Chloride  100 ml @ 


 200 mls/hr  Q8H


 IV  3/15/18 20:00


    3/18/18 04:37


 


 


  (Cardizem)  30 mg  Q6HR  PRN


 PO  3/15/18 18:00


    3/15/18 20:45


 


 


  (Toprol Xl)  100 mg  DAILY


 PO  3/16/18 09:00


    3/18/18 08:39


 


 


  (Microzide)  12.5 mg  DAILY


 PO  3/16/18 09:00


    3/18/18 08:40


 


 


  (Lasix Inj)  20 mg  BID


 IV PUSH  3/16/18 21:00


    3/18/18 08:39


 


 


  (Cardizem Cd)  180 mg  DAILY


 PO  3/18/18 09:00


    3/18/18 08:39


 


 


  (Catapres)  0.1 mg  Q6H  PRN


 PO  3/17/18 11:30


     


 








Vital Signs / I&O





Vital Signs








  Date Time  Temp Pulse Resp B/P (MAP) Pulse Ox O2 Delivery O2 Flow Rate FiO2


 


3/18/18 08:00      Nasal Cannula 2.00 


 


3/18/18 08:00  100      


 


3/18/18 08:00 98.6 77 20 91/50 (64) 98   


 


3/18/18 04:00 97.8 94 19 152/85 (107) 98   


 


3/18/18 03:56  99      


 


3/18/18 00:12 98.9 92 18 162/94 (116) 93   


 


3/17/18 23:52  98      


 


3/17/18 21:50      Nasal Cannula 2.00 


 


3/17/18 20:00 98.1 106 20 165/99 (121) 96   


 


3/17/18 19:59  103      


 


3/17/18 16:31  85      


 


3/17/18 16:00 98.3 110 20 162/54 (90) 97   


 


3/17/18 12:00 98.2 114 20 102/53 (69) 96   


 


3/17/18 12:00  107      














I/O      


 


 3/17/18 3/17/18 3/17/18 3/18/18 3/18/18 3/18/18





 07:00 15:00 23:00 07:00 15:00 23:00


 


Intake Total 707 ml 480 ml 100 ml 700 ml  


 


Balance 707 ml 480 ml 100 ml 700 ml  


 


      


 


Intake Oral 550 ml 480 ml  600 ml  


 


IV Total 157 ml  100 ml 100 ml  


 


# Voids 1 4 2 5  


 


# Bowel Movements 1 2    








Physical Exam


GENERAL: Well developed, well nourished. No acute distress.


HEENT: Jugular venous pressure is 8 cm water.


CHEST: Minimal right basilar crackles.


CARDIAC: Irregular rate and rhythm without S3, S4.  I-II/VI systolic murmur 

lower left sternal border.


ABDOMEN: Soft, nontender, no hepatosplenomegaly. Bowel sounds present.


EXTREMITIES: No clubbing, cyanosis, or edema.


Laboratory





Laboratory Tests








Test


  3/17/18


14:55 3/18/18


07:36


 


Urine Color YELLOW  


 


Urine Turbidity CLEAR  


 


Urine pH 5.0  


 


Urine Specific Gravity 1.007  


 


Urine Protein NEG mg/dL  


 


Urine Glucose (UA) NEG mg/dL  


 


Urine Ketones NEG mg/dL  


 


Urine Occult Blood NEG  


 


Urine Nitrite NEG  


 


Urine Bilirubin NEG  


 


Urine Urobilinogen


  LESS THAN 2.0


MG/DL 


 


 


Urine Leukocyte Esterase NEG  


 


Urine RBC 1 /hpf  


 


Urine WBC


  LESS THAN 1


/hpf 


 


 


Urine Squamous Epithelial


Cells <1 /hpf 


  


 


 


Urine Mucus FEW /lpf  


 


Microscopic Urinalysis Comment


  CULT NOT


INDICATED 


 


 


White Blood Count  21.1 TH/MM3 


 


Red Blood Count  3.20 MIL/MM3 


 


Hemoglobin  10.3 GM/DL 


 


Hematocrit  30.4 % 


 


Mean Corpuscular Volume  95.1 FL 


 


Mean Corpuscular Hemoglobin  32.2 PG 


 


Mean Corpuscular Hemoglobin


Concent 


  33.9 % 


 


 


Red Cell Distribution Width  17.8 % 


 


Platelet Count  150 TH/MM3 


 


Mean Platelet Volume  10.1 FL 


 


CBC Comment  AUTO DIFF 


 


Prothrombin Time  21.5 SEC 


 


Prothromb Time International


Ratio 


  2.1 RATIO 


 











Assessment and Plan


Problem List:  


(1) Congestive heart failure (CHF)


ICD Codes:  I50.9 - Heart failure, unspecified


Status:  Acute


Plan:  Symptomatically much better though doesn't appear to have diuresed much.

  Weight unchanged.  EF 50% by echo. CHF possibly in part precipitated by 

anemia.  Rec continue beta blocker, ARB, IV Lasix diuresis.  Will f/u as 

needed.  





(2) Chronic atrial fibrillation


ICD Codes:  I48.2 - Chronic atrial fibrillation


Status:  Chronic


Plan:  Chronic stable atrial fibrillation.  INR therapeutic.  No HR control 

issues.





(3) Pulmonary hypertension


ICD Codes:  I27.20 - Pulmonary hypertension, unspecified


Status:  Chronic


Plan:  Severely elevated systolic pulmonary pressure measurements on good 

quality echo.  Rec pulmonary consultation, consider sildenafil.





Code Status


full code


Discussed Condition With


patient





Problem Qualifiers





(1) Congestive heart failure (CHF):  


Qualified Codes:  I50.9 - Heart failure, unspecified








Bal Clayton MD Mar 18, 2018 11:32

## 2018-03-18 NOTE — HHI.PR
Subjective


Remarks


Denies cp/sob


Afebrile


BP fluctuating low today in the 90's systolic.





Objective


Vitals





Vital Signs








  Date Time  Temp Pulse Resp B/P (MAP) Pulse Ox O2 Delivery O2 Flow Rate FiO2


 


3/18/18 08:00      Nasal Cannula 2.00 


 


3/18/18 08:00  100      


 


3/18/18 08:00 98.6 77 20 91/50 (64) 98   


 


3/18/18 04:00 97.8 94 19 152/85 (107) 98   


 


3/18/18 03:56  99      


 


3/18/18 00:12 98.9 92 18 162/94 (116) 93   


 


3/17/18 23:52  98      


 


3/17/18 21:50      Nasal Cannula 2.00 


 


3/17/18 20:00 98.1 106 20 165/99 (121) 96   


 


3/17/18 19:59  103      


 


3/17/18 16:31  85      


 


3/17/18 16:00 98.3 110 20 162/54 (90) 97   














I/O      


 


 3/17/18 3/17/18 3/17/18 3/18/18 3/18/18 3/18/18





 06:59 14:59 22:59 06:59 14:59 22:59


 


Intake Total 707 ml 480 ml 100 ml 700 ml  


 


Balance 707 ml 480 ml 100 ml 700 ml  


 


      


 


Intake Oral 550 ml 480 ml  600 ml  


 


IV Total 157 ml  100 ml 100 ml  


 


# Voids 1 4 2 5  


 


# Bowel Movements 1 2    








Result Diagram:  


3/18/18 0736                                                                   

             3/18/18 0736





Imaging





Last Impressions








Liver Ultrasound 3/16/18 0000 Signed





Impressions: 





 Service Date/Time:  Friday, March 16, 2018 09:51 - CONCLUSION:  1. Numerous 





 gallstones with sludge but without biliary ductal dilatation or gallbladder 

wall 





 thickening. No free fluid.     Rogerio Parks MD 


 


Abdomen/Pelvis CT 3/15/18 1712 Signed





Impressions: 





 Service Date/Time:  Thursday, March 15, 2018 18:09 - CONCLUSION:  1. Air in 

the 





 urinary bladder including some in the right wall of the bladder. This is 





 nonspecific but the differential would include emphysematous cystitis but this 





 is considered somewhat unlikely as I don't see significant wall thickening or 





 high-grade inflammatory changes. A colovesical fistula would also be in the 





 differential. Diverticulosis without definite diverticulitis seen of the 





 adjacent sigmoid colon. Finally, as there is age indeterminate but potentially 





 acute left inferior pubic ramus fracture, the air in the bladder could be 





 posttraumatic. If so, only a small amount of urine has leaked. 2. Suspected 





 venous congestion of the liver. Otherwise, solid organs are within normal 





 limits.     Anderson Weldon MD 


 


CT Angiography 3/15/18 1702 Signed





Impressions: 





 Service Date/Time:  Thursday, March 15, 2018 18:09 - CONCLUSION:  1. No 





 pulmonary embolus. 2. Small to moderate bilateral pleural effusions. 3. Mild 





 dependent/compressive atelectasis of both bases. 4. Mild biatrial enlargement 

of 





 the heart. 5. Coronary artery calcification. 6. Nonspecific mediastinal 





 lymphadenopathy.      Anderson Weldon MD 


 


Head CT 3/15/18 1512 Signed





Impressions: 





 Service Date/Time:  Thursday, March 15, 2018 15:46 - CONCLUSION:  1. Mild 





 periventricular white matter small vessel ischemic changes bilaterally.  2. 

Mild 





 cerebral atrophy.  3. 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 





 ganglia and right cerebellar hemisphere.  4. No acute infarct, acute hemorrhage

, 





 midline shift or extra-axial fluid collections.     Pelon García MD 


 


Chest X-Ray 3/15/18 1512 Signed





Impressions: 





 Service Date/Time:  Thursday, March 15, 2018 15:31 - CONCLUSION:  Bibasilar 





 patchiness consistent with atelectasis and/or infiltrates.     Pelon García MD 








Objective Remarks


Awake and alert and oriented 3.





Acute distress.


Breathing is nonlabored.


S1-S2 Irregular rate or rythm


Abdomen is soft, nt, nd


no edema in lower extremities


Procedures


None


Medications and IVs





Current Medications








 Medications


  (Trade)  Dose


 Ordered  Sig/Kellie


 Route  Start Time


 Stop Time Status Last Admin


 


  (NS Flush)  2 ml  UNSCH  PRN


 IV FLUSH  3/15/18 17:30


     


 


 


  (NS Flush)  2 ml  BID


 IV FLUSH  3/15/18 21:00


    3/18/18 08:40


 


 


  (Tylenol)  650 mg  Q4H  PRN


 PO  3/15/18 17:30


     


 


 


  (Narcan Inj)  0.4 mg  UNSCH  PRN


 IV PUSH  3/15/18 17:30


     


 


 


  (Milk Of


 Magnesia Liq)  30 ml  Q12H  PRN


 PO  3/15/18 17:30


     


 


 


  (Senokot)  17.2 mg  Q12H  PRN


 PO  3/15/18 17:30


     


 


 


  (Lactulose Liq)  30 ml  DAILY  PRN


 PO  3/15/18 17:30


     


 


 


  (Protonix)  20 mg  DAILY


 PO  3/16/18 09:00


    3/18/18 08:39


 


 


  (Premarin)  0.3 mg  DAILY


 PO  3/16/18 09:00


   Future Hold 3/16/18 09:00


 


 


  (Diovan)  320 mg  DAILY


 PO  3/16/18 09:00


    3/18/18 08:39


 


 


  (Coumadin)  2.5 mg  DAILY@1600


 PO  3/17/18 16:00


    3/17/18 17:37


 


 


 Pharmacy Profile


 Note  0 ml @ 0


 mls/hr  UNSCH


 OTHER  3/15/18 17:45


     


 


 


 Aztreonam 1000 mg/


 Sodium Chloride  100 ml @ 


 200 mls/hr  Q8H


 IV  3/15/18 20:00


    3/18/18 12:11


 


 


  (Cardizem)  30 mg  Q6HR  PRN


 PO  3/15/18 18:00


    3/15/18 20:45


 


 


  (Toprol Xl)  100 mg  DAILY


 PO  3/16/18 09:00


    3/18/18 08:39


 


 


  (Microzide)  12.5 mg  DAILY


 PO  3/16/18 09:00


    3/18/18 08:40


 


 


  (Cardizem Cd)  180 mg  DAILY


 PO  3/18/18 09:00


    3/18/18 08:39


 


 


  (Catapres)  0.1 mg  Q6H  PRN


 PO  3/17/18 11:30


     


 


 


  (Lasix Inj)  40 mg  BID@0900,1800


 IV PUSH  3/18/18 18:00


     


 











A/P


Problem List:  


(1) Exertional dyspnea


ICD Code:  R06.09 - Other forms of dyspnea


Plan:  CTA negative for PE.  Small to moderate bilateral pleural effusions.  

Mild dependent/compressive atelectasis at both bases.  Mild biatrial 

enlargement of the heart.


Likely multifactorial etiology from atrial fibrillation with RVR, symptomatic 

anemia and acute diastolic heart failure.


Echocardiogram shows EF of 50%, no definite regional wall motion abnormality, 

left atrial size is moderately dilated, right atrial size is mild to moderately 

dilated, moderate mitral annular calcification present, moderate mitral valve 

regurgitation, trileaflet aortic valve but no stenosis or regurgitation.  

Moderate to severe tricuspid regurgitation.  Pulmonary hypertension.


Continue supplemental oxygen to keep oxygen saturation more than 92%.





(2) Atrial fibrillation with RVR


ICD Code:  I48.91 - Unspecified atrial fibrillation


Plan:  Patient presented with heart rate between 80 and 150 bpm.


Continue home medications.


On Coumadin with therapeutic INR.


3/18 Heart rate controlled.





(3) Symptomatic anemia


ICD Code:  D64.9 - Anemia, unspecified


Status:  Acute


Plan:  Patient has history of chronic anemia however as per patient report last 

hemoglobin was 11.


Status post transfusion of 2 units of packed red blood cells with appropriate 

hemoglobin response from 7.9-10.2.


Iron studies consistent with hemochromatosis with increased iron, low TIBC, 

increased percent saturation of iron, elevated ferritin.


Check stool for Hemoccult blood --> ordered and pending.





(4) Chronic anticoagulation


ICD Code:  Z79.01 - Long term (current) use of anticoagulants


Plan:  INR therapeutic.


Continue to monitor PT and INR daily.


Continue Coumadin.


Dose as per Pharmacy recommendations





(5) Transaminitis


ICD Code:  R74.0 - Nonspecific elevation of levels of transaminase and lactic 

acid dehydrogenase [LDH]


Plan:  Liver ultrasound shows numerous gallstones with sludge but without 

biliary ductal dilatation or gallbladder wall thickening.


3/18 AST and ALT are trending down.  ALT on admission was 378, down to 125.  

AST is also trending down from 455 down to 53.


Continue to monitor liver function tests.





(6) Hypertension


ICD Code:  I10 - Essential (primary) hypertension


Plan:  Currently on Valsartan, HCTZ, Metoprolol Succinate and Diltiazem.


3/18 BP low today at 91/50, cut Valsartan dose to 160 mg po daily, DC HCTZ.  

Monitor vital signs.





(7) Leukocytosis


ICD Code:  D72.829 - Elevated white blood cell count, unspecified


Status:  Acute


Plan:   with bands of 12%.


Check urinalysis.


WBCs trending down, continue to monitor.


Currently on IV Aztreonam - unclear what infections is being treated.


3/18 UA negative.  WBC trended up to 21K.  However, patient is a febrile and 

denies diarrhea or other signs of infection.  Continue to monitor CBC with 

differential.





(8) Abnormal CT scan, bladder


ICD Code:  R93.41 - Abnormal radiologic findings on diagnostic imaging of renal 

pelvis, ureter, or bladder


Plan:  CT of the abdomen and pelvis describes air in the urinary bladder.  

Differential diagnosis includes for similar cystitis or colovesicular fistula.


3/18 appreciate urology recommendations.  Urinalysis negative.  UTI ruled out.  

Continue to monitor CBC with differential.





(9) Pulmonary hypertension


ICD Code:  I27.20 - Pulmonary hypertension, unspecified


Status:  Chronic


Plan:  As seen on echocardiogram described above.


Cardiology consulted.


Continue diuresis.


Await pulmonary recommendations.  Pulmonology consult ordered on 3/16.





(10) Acute diastolic heart failure


ICD Code:  I50.31 - Acute diastolic (congestive) heart failure


Status:  Acute


Plan:  BL pleural effusions on CTA


Pulm htn


echo w ef 50 %


Continur furosemide - Dose increased as oer cardiology to 40 mg IV BID





(11) Hypokalemia


ICD Code:  E87.6 - Hypokalemia


Status:  Acute


Plan:  Likely due to potassium excretion with furosemide use.


We will replace IV and orally, continue to monitor BMP and replace as needed.





Assessment and Plan


DVT prophylaxis: on coumadin


Discharge Planning


pending urology consult and clinical improvement.





Problem Qualifiers





(1) Hypertension:  


Qualified Codes:  I10 - Essential (primary) hypertension


(2) Leukocytosis:  


Qualified Codes:  D72.825 - Bandemia








Cem Gordon MD Mar 18, 2018 12:40

## 2018-03-18 NOTE — MB
cc:

Viraj Toth MD

****

 

 

DATE OF CONSULT:

 

REASON FOR CONSULTATION:

 

Pulmonary hypertension.

 

HISTORY OF PRESENT ILLNESS:

 

The patient is an 83-year-old female who is known to have history of 

hypertension, atrial fibrillation, hemochromatosis on chronic 

anticoagulation, came with shortness of breath mainly with exertion.  

She was evaluated by cardiology and was found to have atrial 

fibrillation with RVR.  The patient does have evidence of congestive 

heart failure.  Her pulmonary pressure was 75.  The patient reported 

she is not having any chest pain.  She does not have lower extremity 

edema.  She is not having any hemoptysis.

 

PAST MEDICAL HISTORY:

 

Reviewed in detail.  Positive as mentioned in the HPI.

 

PAST SURGICAL HISTORY:

 

Reviewed.

 

MEDICATIONS:

 

Reviewed in detail.  Of note, she is on warfarin and Premarin.

 

She is also on metoprolol and Cartia.

 

REVIEW OF SYSTEMS:

 

Negative except for as mentioned in the HPI.

 

PHYSICAL EXAMINATION:

 

VITAL SIGNS:

Shows temperature 98.5, pulse 92, respiration rate 20, blood pressure 

156/82.  She is sating 95% on 2 liters nasal cannula.

HEENT:  Head:  Atraumatic, normocephalic.

NECK:  Trachea midline.

LUNGS:  Clear.

HEART:  S1, S2.

ABDOMEN: Soft, nondistended.

EXTREMITIES:  No significant edema.

NEUROLOGIC:  Alert and oriented x 3, no focal deficits.

 

DATA:

I reviewed her echocardiogram that did show the pulmonary pressures of

75.  Her  is 50%.

 

I reviewed her CT angiogram that did not show any pulmonary embolism.

 

ASSESSMENT AND PLAN:

1.  Pulmonary hypertension.

 

At this point, I think it is mainly World Health Organization Class II

secondary to cardiac disease; however,  I cannot rule out any other 

contributory causes.  I would recommend right heart catheterization to

check her wedge pressure and depending on the results of the right 

heart catheterization we can decide on the next step.

 

I would like her to be optimized from a cardiac perspective.

 

Of note, all this workup can be done as an outpatient and it is an 

outpatient workup.  If she does indeed have pulmonary hypertension 

whether it is WHO Class I or III or IV we may need to refer her to a 

pulmonary hypertension clinic.  However, at this point she is okay to 

go home from my perspective.  I would like to assess her oxygen 

requirements with exertion to see whether she needs oxygen or not.  I 

will followup with her in the office.

 

I discussed this case with the hospitalist, Dr. Golden.  I will 

sign off at this point.

 

 

__________________________________

MD STEPHANIE Crowder/MATHEUS

D: 03/18/2018, 01:49 PM

T: 03/18/2018, 03:00 PM

Visit #: [id number]

Job #: [id number]

## 2018-03-19 VITALS
SYSTOLIC BLOOD PRESSURE: 141 MMHG | DIASTOLIC BLOOD PRESSURE: 89 MMHG | OXYGEN SATURATION: 98 % | TEMPERATURE: 98.3 F | HEART RATE: 94 BPM | RESPIRATION RATE: 18 BRPM

## 2018-03-19 VITALS
HEART RATE: 75 BPM | SYSTOLIC BLOOD PRESSURE: 124 MMHG | RESPIRATION RATE: 18 BRPM | DIASTOLIC BLOOD PRESSURE: 68 MMHG | OXYGEN SATURATION: 95 % | TEMPERATURE: 99.3 F

## 2018-03-19 VITALS
SYSTOLIC BLOOD PRESSURE: 136 MMHG | DIASTOLIC BLOOD PRESSURE: 74 MMHG | OXYGEN SATURATION: 98 % | RESPIRATION RATE: 18 BRPM | HEART RATE: 102 BPM | TEMPERATURE: 98.1 F

## 2018-03-19 VITALS
TEMPERATURE: 99.6 F | HEART RATE: 99 BPM | SYSTOLIC BLOOD PRESSURE: 156 MMHG | OXYGEN SATURATION: 93 % | RESPIRATION RATE: 18 BRPM | DIASTOLIC BLOOD PRESSURE: 84 MMHG

## 2018-03-19 VITALS
RESPIRATION RATE: 18 BRPM | SYSTOLIC BLOOD PRESSURE: 124 MMHG | DIASTOLIC BLOOD PRESSURE: 69 MMHG | TEMPERATURE: 98.1 F | HEART RATE: 91 BPM | OXYGEN SATURATION: 99 %

## 2018-03-19 VITALS — HEART RATE: 83 BPM

## 2018-03-19 VITALS
HEART RATE: 89 BPM | TEMPERATURE: 98.1 F | OXYGEN SATURATION: 98 % | RESPIRATION RATE: 18 BRPM | SYSTOLIC BLOOD PRESSURE: 148 MMHG | DIASTOLIC BLOOD PRESSURE: 80 MMHG

## 2018-03-19 VITALS — HEART RATE: 81 BPM

## 2018-03-19 VITALS — HEART RATE: 112 BPM

## 2018-03-19 VITALS — HEART RATE: 89 BPM

## 2018-03-19 VITALS — HEART RATE: 92 BPM

## 2018-03-19 VITALS — OXYGEN SATURATION: 96 %

## 2018-03-19 VITALS — HEART RATE: 82 BPM

## 2018-03-19 VITALS — HEART RATE: 109 BPM

## 2018-03-19 LAB
ALBUMIN SERPL-MCNC: 2.6 GM/DL (ref 3.4–5)
ALP SERPL-CCNC: 181 U/L (ref 45–117)
ALT SERPL-CCNC: 96 U/L (ref 10–53)
AST SERPL-CCNC: 35 U/L (ref 15–37)
BASOPHILS # BLD AUTO: 0.2 TH/MM3 (ref 0–0.2)
BASOPHILS NFR BLD: 0.6 % (ref 0–2)
BILIRUB SERPL-MCNC: 2 MG/DL (ref 0.2–1)
BUN SERPL-MCNC: 15 MG/DL (ref 7–18)
CALCIUM SERPL-MCNC: 7.9 MG/DL (ref 8.5–10.1)
CHLORIDE SERPL-SCNC: 100 MEQ/L (ref 98–107)
CREAT SERPL-MCNC: 0.69 MG/DL (ref 0.5–1)
EOSINOPHIL # BLD: 0.5 TH/MM3 (ref 0–0.4)
EOSINOPHIL NFR BLD: 1.7 % (ref 0–4)
ERYTHROCYTE [DISTWIDTH] IN BLOOD BY AUTOMATED COUNT: 18.2 % (ref 11.6–17.2)
GFR SERPLBLD BASED ON 1.73 SQ M-ARVRAT: 82 ML/MIN (ref 89–?)
GLUCOSE SERPL-MCNC: 150 MG/DL (ref 74–106)
HCO3 BLD-SCNC: 27.8 MEQ/L (ref 21–32)
HCT VFR BLD CALC: 31.4 % (ref 35–46)
HGB BLD-MCNC: 10.5 GM/DL (ref 11.6–15.3)
INR PPP: 1.8 RATIO
LYMPHOCYTES # BLD AUTO: 2.3 TH/MM3 (ref 1–4.8)
LYMPHOCYTES NFR BLD AUTO: 8.7 % (ref 9–44)
LYMPHOCYTES: 13 % (ref 9–44)
MCH RBC QN AUTO: 32 PG (ref 27–34)
MCHC RBC AUTO-ENTMCNC: 33.5 % (ref 32–36)
MCV RBC AUTO: 95.5 FL (ref 80–100)
METAMYELOCYTES NFR BLD: 1 % (ref 0–1)
MONOCYTE #: 5.9 TH/MM3 (ref 0–0.9)
MONOCYTES NFR BLD: 22.2 % (ref 0–8)
MONOCYTES: 18 % (ref 0–8)
MYELOCYTES NFR BLD: 3 % (ref 0–0)
NEUTROPHILS # BLD AUTO: 17.8 TH/MM3 (ref 1.8–7.7)
NEUTROPHILS NFR BLD AUTO: 66.8 % (ref 16–70)
NEUTS BAND # BLD MANUAL: 18.4 TH/MM3 (ref 1.8–7.7)
NEUTS BAND NFR BLD: 6 % (ref 0–6)
NEUTS SEG NFR BLD MANUAL: 59 % (ref 16–70)
PLATELET # BLD: 153 TH/MM3 (ref 150–450)
PMV BLD AUTO: 10.6 FL (ref 7–11)
PROT SERPL-MCNC: 7.2 GM/DL (ref 6.4–8.2)
PROTHROMBIN TIME: 18.4 SEC (ref 9.8–11.6)
RBC # BLD AUTO: 3.28 MIL/MM3 (ref 4–5.3)
SODIUM SERPL-SCNC: 135 MEQ/L (ref 136–145)
WBC # BLD AUTO: 26.7 TH/MM3 (ref 4–11)

## 2018-03-19 RX ADMIN — WARFARIN SODIUM SCH MG: 2.5 TABLET ORAL at 17:04

## 2018-03-19 RX ADMIN — FUROSEMIDE SCH MG: 10 INJECTION, SOLUTION INTRAMUSCULAR; INTRAVENOUS at 08:46

## 2018-03-19 RX ADMIN — PANTOPRAZOLE SODIUM SCH MG: 20 TABLET, DELAYED RELEASE ORAL at 08:44

## 2018-03-19 RX ADMIN — SODIUM PHOSPHATE, DIBASIC, ANHYDROUS, POTASSIUM PHOSPHATE, MONOBASIC, AND SODIUM PHOSPHATE, MONOBASIC, MONOHYDRATE SCH MG: 852; 155; 130 TABLET, COATED ORAL at 06:19

## 2018-03-19 RX ADMIN — SODIUM PHOSPHATE, DIBASIC, ANHYDROUS, POTASSIUM PHOSPHATE, MONOBASIC, AND SODIUM PHOSPHATE, MONOBASIC, MONOHYDRATE SCH MG: 852; 155; 130 TABLET, COATED ORAL at 13:27

## 2018-03-19 RX ADMIN — AZTREONAM SCH MLS/HR: 1 INJECTION, POWDER, LYOPHILIZED, FOR SOLUTION INTRAMUSCULAR; INTRAVENOUS at 21:05

## 2018-03-19 RX ADMIN — Medication SCH ML: at 21:05

## 2018-03-19 RX ADMIN — DILTIAZEM HYDROCHLORIDE SCH MG: 120 CAPSULE, EXTENDED RELEASE ORAL at 08:45

## 2018-03-19 RX ADMIN — METOPROLOL SUCCINATE SCH MG: 50 TABLET, FILM COATED, EXTENDED RELEASE ORAL at 08:46

## 2018-03-19 RX ADMIN — FUROSEMIDE SCH MG: 10 INJECTION, SOLUTION INTRAMUSCULAR; INTRAVENOUS at 17:47

## 2018-03-19 RX ADMIN — VALSARTAN SCH MG: 160 TABLET ORAL at 08:45

## 2018-03-19 RX ADMIN — Medication SCH ML: at 08:44

## 2018-03-19 RX ADMIN — AZTREONAM SCH MLS/HR: 1 INJECTION, POWDER, LYOPHILIZED, FOR SOLUTION INTRAMUSCULAR; INTRAVENOUS at 04:13

## 2018-03-19 RX ADMIN — AZTREONAM SCH MLS/HR: 1 INJECTION, POWDER, LYOPHILIZED, FOR SOLUTION INTRAMUSCULAR; INTRAVENOUS at 12:33

## 2018-03-19 RX ADMIN — SODIUM PHOSPHATE, DIBASIC, ANHYDROUS, POTASSIUM PHOSPHATE, MONOBASIC, AND SODIUM PHOSPHATE, MONOBASIC, MONOHYDRATE SCH MG: 852; 155; 130 TABLET, COATED ORAL at 21:06

## 2018-03-19 NOTE — HHI.FF
Face to Face Verification


Diagnosis:  


(1) Congestive heart failure (CHF)


(2) Chronic atrial fibrillation


(3) Pulmonary hypertension


(4) Acute diastolic heart failure


(5) Abnormal CT scan, bladder


(6) Atrial fibrillation with RVR


Physical Therapy


Order:  Improve ambulation





Home Health Nursing








Order: Medication education-adverse effect





 Nursing assessment with vital signs

















I have seen patient Barb Jack on 3/19/18. My clinical findings support 

the need for the requested home health care services because:








 Patient has SOB





 High risk of falls














I certify that my clinical findings support that this patient is homebound 

because:








 Unsteady gait/balance





 Need for psychosocial assistance

















Cem Gordon MD Mar 19, 2018 11:11

## 2018-03-19 NOTE — HHI.PR
Subjective


Remarks


Patient states feels better.


Denies cp/sob


Denies cough


Denies diarrhea


Denies abdominal pain, nausea or vomiting.


Afebrile





Objective


Vitals





Vital Signs








  Date Time  Temp Pulse Resp B/P (MAP) Pulse Ox O2 Delivery O2 Flow Rate FiO2


 


3/19/18 08:04 98.3 94 18 141/89 (106) 98   


 


3/19/18 04:16 98.1 102 18 136/74 (94) 98   


 


3/19/18 04:04  83      


 


3/19/18 04:00      Nasal Cannula 2.00 


 


3/19/18 00:33  81      


 


3/19/18 00:00      Nasal Cannula 2.00 


 


3/19/18 00:00 99.3 75 18 124/68 (86) 95   


 


3/18/18 21:05      Nasal Cannula 2.00 


 


3/18/18 20:27  81      


 


3/18/18 20:00 99.1 90 18 142/80 (100) 96   


 


3/18/18 18:32      Nasal Cannula 2.00 


 


3/18/18 17:55  90      


 


3/18/18 16:00 98.6 78 20 144/67 (92) 98   














I/O      


 


 3/18/18 3/18/18 3/18/18 3/19/18 3/19/18 3/19/18





 07:00 15:00 23:00 07:00 15:00 23:00


 


Intake Total 700 ml 480 ml 200 ml 580 ml  


 


Output Total    1000 ml  


 


Balance 700 ml 480 ml 200 ml -420 ml  


 


      


 


Intake Oral 600 ml 480 ml  480 ml  


 


IV Total 100 ml  200 ml 100 ml  


 


Output Urine Total    1000 ml  


 


# Voids 5 3    


 


# Bowel Movements  1    








Result Diagram:  


3/19/18 1015                                                                   

             3/19/18 1015





Imaging





Last Impressions








Liver Ultrasound 3/16/18 0000 Signed





Impressions: 





 Service Date/Time:  Friday, March 16, 2018 09:51 - CONCLUSION:  1. Numerous 





 gallstones with sludge but without biliary ductal dilatation or gallbladder 

wall 





 thickening. No free fluid.     Rogerio Parks MD 


 


Abdomen/Pelvis CT 3/15/18 1712 Signed





Impressions: 





 Service Date/Time:  Thursday, March 15, 2018 18:09 - CONCLUSION:  1. Air in 

the 





 urinary bladder including some in the right wall of the bladder. This is 





 nonspecific but the differential would include emphysematous cystitis but this 





 is considered somewhat unlikely as I don't see significant wall thickening or 





 high-grade inflammatory changes. A colovesical fistula would also be in the 





 differential. Diverticulosis without definite diverticulitis seen of the 





 adjacent sigmoid colon. Finally, as there is age indeterminate but potentially 





 acute left inferior pubic ramus fracture, the air in the bladder could be 





 posttraumatic. If so, only a small amount of urine has leaked. 2. Suspected 





 venous congestion of the liver. Otherwise, solid organs are within normal 





 limits.     Anderson Weldon MD 


 


CT Angiography 3/15/18 1702 Signed





Impressions: 





 Service Date/Time:  Thursday, March 15, 2018 18:09 - CONCLUSION:  1. No 





 pulmonary embolus. 2. Small to moderate bilateral pleural effusions. 3. Mild 





 dependent/compressive atelectasis of both bases. 4. Mild biatrial enlargement 

of 





 the heart. 5. Coronary artery calcification. 6. Nonspecific mediastinal 





 lymphadenopathy.      Anderson Weldon MD 


 


Head CT 3/15/18 1512 Signed





Impressions: 





 Service Date/Time:  Thursday, March 15, 2018 15:46 - CONCLUSION:  1. Mild 





 periventricular white matter small vessel ischemic changes bilaterally.  2. 

Mild 





 cerebral atrophy.  3. 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 





 ganglia and right cerebellar hemisphere.  4. No acute infarct, acute hemorrhage

, 





 midline shift or extra-axial fluid collections.     Pelon García MD 


 


Chest X-Ray 3/15/18 1512 Signed





Impressions: 





 Service Date/Time:  Thursday, March 15, 2018 15:31 - CONCLUSION:  Bibasilar 





 patchiness consistent with atelectasis and/or infiltrates.     Pelon García MD 








Objective Remarks


Awake and alert and oriented 3.





Acute distress.


Breathing is nonlabored.


S1-S2 Irregular rate or rythm


Abdomen is soft, nt, nd


no edema in lower extremities


Procedures


None


Medications and IVs





Current Medications








 Medications


  (Trade)  Dose


 Ordered  Sig/Kellie


 Route  Start Time


 Stop Time Status Last Admin


 


  (NS Flush)  2 ml  UNSCH  PRN


 IV FLUSH  3/15/18 17:30


     


 


 


  (NS Flush)  2 ml  BID


 IV FLUSH  3/15/18 21:00


    3/19/18 08:44


 


 


  (Tylenol)  650 mg  Q4H  PRN


 PO  3/15/18 17:30


     


 


 


  (Narcan Inj)  0.4 mg  UNSCH  PRN


 IV PUSH  3/15/18 17:30


     


 


 


  (Milk Of


 Magnesia Liq)  30 ml  Q12H  PRN


 PO  3/15/18 17:30


     


 


 


  (Senokot)  17.2 mg  Q12H  PRN


 PO  3/15/18 17:30


     


 


 


  (Lactulose Liq)  30 ml  DAILY  PRN


 PO  3/15/18 17:30


     


 


 


  (Protonix)  20 mg  DAILY


 PO  3/16/18 09:00


    3/19/18 08:44


 


 


  (Premarin)  0.3 mg  DAILY


 PO  3/16/18 09:00


   Future Hold 3/16/18 09:00


 


 


  (Coumadin)  2.5 mg  DAILY@1600


 PO  3/17/18 16:00


    3/18/18 16:51


 


 


 Pharmacy Profile


 Note  0 ml @ 0


 mls/hr  UNSCH


 OTHER  3/15/18 17:45


     


 


 


 Aztreonam 1000 mg/


 Sodium Chloride  100 ml @ 


 200 mls/hr  Q8H


 IV  3/15/18 20:00


    3/19/18 12:33


 


 


  (Cardizem)  30 mg  Q6HR  PRN


 PO  3/15/18 18:00


    3/15/18 20:45


 


 


  (Toprol Xl)  100 mg  DAILY


 PO  3/16/18 09:00


    3/19/18 08:46


 


 


  (Catapres)  0.1 mg  Q6H  PRN


 PO  3/17/18 11:30


     


 


 


  (Lasix Inj)  40 mg  BID@0900,1800


 IV PUSH  3/18/18 18:00


     


 


 


  (Cardizem Cd)  120 mg  DAILY


 PO  3/19/18 09:00


    3/19/18 08:45


 


 


  (Diovan)  160 mg  DAILY


 PO  3/19/18 09:00


    3/19/18 08:45


 


 


  (K-Phos Neutral)  250 mg  Q8HR


 PO  3/18/18 14:00


    3/19/18 06:19


 











A/P


Problem List:  


(1) Exertional dyspnea


ICD Code:  R06.09 - Other forms of dyspnea


Plan:  CTA negative for PE.  Small to moderate bilateral pleural effusions.  

Mild dependent/compressive atelectasis at both bases.  Mild biatrial 

enlargement of the heart.


Likely multifactorial etiology from atrial fibrillation with RVR, symptomatic 

anemia and acute diastolic heart failure.


Echocardiogram shows EF of 50%, no definite regional wall motion abnormality, 

left atrial size is moderately dilated, right atrial size is mild to moderately 

dilated, moderate mitral annular calcification present, moderate mitral valve 

regurgitation, trileaflet aortic valve but no stenosis or regurgitation.  

Moderate to severe tricuspid regurgitation.  Pulmonary hypertension.


Continue supplemental oxygen to keep oxygen saturation more than 92%.





(2) Atrial fibrillation with RVR


ICD Code:  I48.91 - Unspecified atrial fibrillation


Plan:  Patient presented with heart rate between 80 and 150 bpm.


Continue home medications.


On Coumadin with therapeutic INR.


3/18 Heart rate controlled.





(3) Symptomatic anemia


ICD Code:  D64.9 - Anemia, unspecified


Status:  Acute


Plan:  Patient has history of chronic anemia however as per patient report last 

hemoglobin was 11.


Status post transfusion of 2 units of packed red blood cells with appropriate 

hemoglobin response from 7.9-10.2.


Iron studies consistent with hemochromatosis with increased iron, low TIBC, 

increased percent saturation of iron, elevated ferritin.


Check stool for Hemoccult blood --> ordered and pending.





(4) Chronic anticoagulation


ICD Code:  Z79.01 - Long term (current) use of anticoagulants


Plan:  INR therapeutic.


Continue to monitor PT and INR daily.


Continue Coumadin.


Dose as per Pharmacy recommendations





(5) Transaminitis


ICD Code:  R74.0 - Nonspecific elevation of levels of transaminase and lactic 

acid dehydrogenase [LDH]


Plan:  Liver ultrasound shows numerous gallstones with sludge but without 

biliary ductal dilatation or gallbladder wall thickening.


3/18 AST and ALT are trending down.  ALT on admission was 378, down to 125.  

AST is also trending down from 455 down to 53.


Continue to monitor liver function tests.


3/19 Transaminases continue to improve. Hepatitis profile negative.





(6) Hypertension


ICD Code:  I10 - Essential (primary) hypertension


Plan:  Currently on Valsartan, HCTZ, Metoprolol Succinate and Diltiazem.


3/18 BP low today at 91/50, cut Valsartan dose to 160 mg po daily, DC HCTZ.  

Monitor vital signs.


3/19 BP improved. Monitor BP.





(7) Leukocytosis


ICD Code:  D72.829 - Elevated white blood cell count, unspecified


Status:  Acute


Plan:   with bands of 12%.


Check urinalysis.


WBCs trending down, continue to monitor.


Currently on IV Aztreonam - unclear what infections is being treated.


3/18 UA negative.  WBC trended up to 21K.  However, patient is a febrile and 

denies diarrhea or other signs of infection.  Continue to monitor CBC with 

differential.


3/19 WBC continues to trend up now 20 6K.  However there are no signs of 

infection is still.  The patient denies diarrhea, UA negative.  Leukocytosis 

with predominance of monocytes and some teardrop cells observed on the smear.  

I will consult hematology for further recommendations.





(8) Abnormal CT scan, bladder


ICD Code:  R93.41 - Abnormal radiologic findings on diagnostic imaging of renal 

pelvis, ureter, or bladder


Plan:  CT of the abdomen and pelvis describes air in the urinary bladder.  

Differential diagnosis includes for similar cystitis or colovesicular fistula.


3/18 appreciate urology recommendations.  Urinalysis negative.  UTI ruled out.  

Continue to monitor CBC with differential.





(9) Pulmonary hypertension


ICD Code:  I27.20 - Pulmonary hypertension, unspecified


Status:  Chronic


Plan:  As seen on echocardiogram described above.


Cardiology consulted.


Continue diuresis.


3/19 appreciate pulmonary assistance.  Discussed the case with Dr. Toth.  He 

recommends a right heart catheterization to check the patient's wedge pressure 

and depending on the results the next step could be decided.





(10) Acute diastolic heart failure


ICD Code:  I50.31 - Acute diastolic (congestive) heart failure


Status:  Acute


Plan:  BL pleural effusions on CTA


Pulm htn


echo w ef 50 %


Continur furosemide - Dose increased as per cardiology to 40 mg IV BID





(11) Hypokalemia


ICD Code:  E87.6 - Hypokalemia


Status:  Acute


Plan:  Likely due to potassium excretion with furosemide use.


We will replace IV and orally, continue to monitor BMP and replace as needed.


3/19 hypokalemia resolved.  Monitor BMP and replace as needed.





Assessment and Plan


DVT prophylaxis: on coumadin


Discharge Planning


pending urology consult and clinical improvement.





Problem Qualifiers





(1) Hypertension:  


Qualified Codes:  I10 - Essential (primary) hypertension


(2) Leukocytosis:  


Qualified Codes:  D72.825 - Bandemia








Cem Gordon MD Mar 19, 2018 13:31

## 2018-03-20 VITALS
HEART RATE: 88 BPM | OXYGEN SATURATION: 95 % | SYSTOLIC BLOOD PRESSURE: 138 MMHG | TEMPERATURE: 98.8 F | RESPIRATION RATE: 20 BRPM | DIASTOLIC BLOOD PRESSURE: 79 MMHG

## 2018-03-20 VITALS
TEMPERATURE: 99.8 F | DIASTOLIC BLOOD PRESSURE: 79 MMHG | SYSTOLIC BLOOD PRESSURE: 126 MMHG | OXYGEN SATURATION: 95 % | RESPIRATION RATE: 20 BRPM | HEART RATE: 97 BPM

## 2018-03-20 VITALS
RESPIRATION RATE: 20 BRPM | DIASTOLIC BLOOD PRESSURE: 70 MMHG | SYSTOLIC BLOOD PRESSURE: 148 MMHG | TEMPERATURE: 99.4 F | HEART RATE: 107 BPM | OXYGEN SATURATION: 95 %

## 2018-03-20 VITALS
SYSTOLIC BLOOD PRESSURE: 158 MMHG | TEMPERATURE: 99.7 F | RESPIRATION RATE: 20 BRPM | DIASTOLIC BLOOD PRESSURE: 77 MMHG | OXYGEN SATURATION: 95 % | HEART RATE: 90 BPM

## 2018-03-20 VITALS
HEART RATE: 108 BPM | OXYGEN SATURATION: 98 % | RESPIRATION RATE: 18 BRPM | TEMPERATURE: 99.2 F | SYSTOLIC BLOOD PRESSURE: 130 MMHG | DIASTOLIC BLOOD PRESSURE: 76 MMHG

## 2018-03-20 VITALS — HEART RATE: 103 BPM

## 2018-03-20 VITALS
HEART RATE: 109 BPM | TEMPERATURE: 99.3 F | DIASTOLIC BLOOD PRESSURE: 75 MMHG | SYSTOLIC BLOOD PRESSURE: 121 MMHG | OXYGEN SATURATION: 95 % | RESPIRATION RATE: 20 BRPM

## 2018-03-20 VITALS — HEART RATE: 91 BPM

## 2018-03-20 LAB
ERYTHROCYTE [DISTWIDTH] IN BLOOD BY AUTOMATED COUNT: 17.8 % (ref 11.6–17.2)
HCT VFR BLD CALC: 30 % (ref 35–46)
HGB BLD-MCNC: 9.9 GM/DL (ref 11.6–15.3)
INR PPP: 1.7 RATIO
LYMPHOCYTES: 15 % (ref 9–44)
MCH RBC QN AUTO: 31.5 PG (ref 27–34)
MCHC RBC AUTO-ENTMCNC: 33 % (ref 32–36)
MCV RBC AUTO: 95.5 FL (ref 80–100)
MONOCYTES: 14 % (ref 0–8)
MYELOCYTES NFR BLD: 3 % (ref 0–0)
NEUTS BAND # BLD MANUAL: 21 TH/MM3 (ref 1.8–7.7)
NEUTS BAND NFR BLD: 6 % (ref 0–6)
NEUTS SEG NFR BLD MANUAL: 61 % (ref 16–70)
PLATELET # BLD: 167 TH/MM3 (ref 150–450)
PMV BLD AUTO: 10.4 FL (ref 7–11)
PROTHROMBIN TIME: 17.5 SEC (ref 9.8–11.6)
RBC # BLD AUTO: 3.14 MIL/MM3 (ref 4–5.3)
WBC # BLD AUTO: 30 TH/MM3 (ref 4–11)

## 2018-03-20 RX ADMIN — SODIUM PHOSPHATE, DIBASIC, ANHYDROUS, POTASSIUM PHOSPHATE, MONOBASIC, AND SODIUM PHOSPHATE, MONOBASIC, MONOHYDRATE SCH MG: 852; 155; 130 TABLET, COATED ORAL at 20:46

## 2018-03-20 RX ADMIN — AZTREONAM SCH MLS/HR: 1 INJECTION, POWDER, LYOPHILIZED, FOR SOLUTION INTRAMUSCULAR; INTRAVENOUS at 12:32

## 2018-03-20 RX ADMIN — AZTREONAM SCH MLS/HR: 1 INJECTION, POWDER, LYOPHILIZED, FOR SOLUTION INTRAMUSCULAR; INTRAVENOUS at 03:17

## 2018-03-20 RX ADMIN — PANTOPRAZOLE SODIUM SCH MG: 20 TABLET, DELAYED RELEASE ORAL at 08:38

## 2018-03-20 RX ADMIN — FUROSEMIDE SCH MG: 10 INJECTION, SOLUTION INTRAMUSCULAR; INTRAVENOUS at 08:38

## 2018-03-20 RX ADMIN — AZTREONAM SCH MLS/HR: 1 INJECTION, POWDER, LYOPHILIZED, FOR SOLUTION INTRAMUSCULAR; INTRAVENOUS at 20:46

## 2018-03-20 RX ADMIN — Medication SCH ML: at 08:37

## 2018-03-20 RX ADMIN — VALSARTAN SCH MG: 160 TABLET ORAL at 08:38

## 2018-03-20 RX ADMIN — Medication SCH ML: at 20:47

## 2018-03-20 RX ADMIN — WARFARIN SODIUM SCH MG: 2.5 TABLET ORAL at 16:23

## 2018-03-20 RX ADMIN — METOPROLOL SUCCINATE SCH MG: 50 TABLET, FILM COATED, EXTENDED RELEASE ORAL at 08:37

## 2018-03-20 RX ADMIN — SODIUM PHOSPHATE, DIBASIC, ANHYDROUS, POTASSIUM PHOSPHATE, MONOBASIC, AND SODIUM PHOSPHATE, MONOBASIC, MONOHYDRATE SCH MG: 852; 155; 130 TABLET, COATED ORAL at 05:29

## 2018-03-20 RX ADMIN — DILTIAZEM HYDROCHLORIDE SCH MG: 120 CAPSULE, EXTENDED RELEASE ORAL at 08:38

## 2018-03-20 RX ADMIN — SODIUM PHOSPHATE, DIBASIC, ANHYDROUS, POTASSIUM PHOSPHATE, MONOBASIC, AND SODIUM PHOSPHATE, MONOBASIC, MONOHYDRATE SCH MG: 852; 155; 130 TABLET, COATED ORAL at 14:30

## 2018-03-20 RX ADMIN — FUROSEMIDE SCH MG: 10 INJECTION, SOLUTION INTRAMUSCULAR; INTRAVENOUS at 18:00

## 2018-03-20 NOTE — PD.ONC.PN
Subjective


Subjective


Remarks


Tmax 99.8 overnight. 


Patient resting in bed. 


No complaints.





Objective


Data











  Date Time  Temp Pulse Resp B/P (MAP) Pulse Ox O2 Delivery O2 Flow Rate FiO2


 


3/20/18 12:00 99.7 97 20 158/77 (104) 95   


 


3/20/18 08:00  98      


 


3/20/18 08:00 99.8 97 20 126/79 (95) 95   


 


3/20/18 07:33      Nasal Cannula 2.00 


 


3/20/18 03:41  91      


 


3/20/18 03:15      Nasal Cannula 2.00 


 


3/20/18 03:15 99.4 107 20 148/70 (96) 95   


 


3/20/18 00:00 99.2 108 18 130/76 (94) 98   


 


3/19/18 23:40  109      


 


3/19/18 21:03      Nasal Cannula 2.00 


 


3/19/18 20:00 99.6 99 18 156/84 (108) 93   


 


3/19/18 19:55      Nasal Cannula 2.00 


 


3/19/18 19:42  112      


 


3/19/18 16:04 98.1 91 18 124/69 (87) 99   


 


3/19/18 16:00  89      


 


3/19/18 16:00      Nasal Cannula 2.00 


 


3/19/18 13:23     96 Nasal Cannula 2.00 


 


3/19/18 13:23       2.00 














 3/20/18 3/20/18 3/20/18





 07:00 15:00 23:00


 


Intake Total 120 ml  


 


Output Total 200 ml  


 


Balance -80 ml  








Result Diagram:  


3/19/18 1015                                                                   

             3/19/18 1015





Laboratory Results





Laboratory Tests








Test


  3/20/18


06:02 3/20/18


09:06


 


Erythrocyte Sedimentation Rate 21 mm/hr  


 


C-Reactive Protein 4.40 MG/DL  


 


Prothrombin Time  17.5 SEC 


 


Prothromb Time International


Ratio 


  1.7 RATIO 


 











Administered Medications








 Medications


  (Trade)  Dose


 Ordered  Sig/Kellie


 Route


 PRN Reason  Start Time


 Stop Time Status Last Admin


Dose Admin


 


 Sodium Chloride


  (NS Flush)  2 ml  BID


 IV FLUSH


   3/15/18 21:00


    3/20/18 08:37


 


 


 Pantoprazole


 Sodium


  (Protonix)  20 mg  DAILY


 PO


   3/16/18 09:00


    3/20/18 08:38


 


 


 Estrogens


 Conjugated


  (Premarin)  0.3 mg  DAILY


 PO


   3/16/18 09:00


   Future Hold 3/16/18 09:00


 


 


 Warfarin Sodium


  (Coumadin)  2.5 mg  DAILY@1600


 PO


   3/17/18 16:00


    3/19/18 17:04


 


 


 Aztreonam 1000 mg/


 Sodium Chloride  100 ml @ 


 200 mls/hr  Q8H


 IV


   3/15/18 20:00


    3/20/18 12:32


 


 


 Diltiazem HCl


  (Cardizem)  30 mg  Q6HR  PRN


 PO


 HR>100  3/15/18 18:00


    3/15/18 20:45


 


 


 Metoprolol


 Succinate


  (Toprol Xl)  100 mg  DAILY


 PO


   3/16/18 09:00


    3/20/18 08:37


 


 


 Diltiazem HCl


  (Cardizem Cd)  120 mg  DAILY


 PO


   3/19/18 09:00


    3/20/18 08:38


 


 


 Valsartan


  (Diovan)  160 mg  DAILY


 PO


   3/19/18 09:00


    3/20/18 08:38


 


 


 Potassium Phos/


 Sodium Phos


  (K-Phos Neutral)  250 mg  Q8HR


 PO


   3/18/18 14:00


    3/20/18 05:29


 








Objective Remarks


GENERAL: Elderly female, sitting up in bed in nad. 


SKIN: Warm and dry.


HEAD: Normocephalic.


EYES: No injection or drainage. 


NECK: Supple, trachea midline. 


CARDIOVASCULAR: Regular rate and rhythm


RESPIRATORY: Breath sounds equal bilaterally. No accessory muscle use.


GASTROINTESTINAL: Abdomen soft, non-tender, nondistended. 


EXTREMITIES: No cyanosis


NEUROLOGICAL: awake and alert. normal speech. moving all extremities.





Assessment/Plan


Problem List:  


(1) Leukocytosis


ICD Codes:  D72.829 - Elevated white blood cell count, unspecified


Status:  Acute


Plan:  3/20: ESR within normal limits, however CRP elevated, consistent with 

inflammation;  will await BCR-ABL/JAK2 mutation.  face sheet faxed to new 

patient referrals for follow up upon discharge.


--Leukocytosis with a left shift.  


--differential includes myeloproliferative disorder vs reactive process.





(2) Symptomatic anemia


ICD Codes:  D64.9 - Anemia, unspecified


Status:  Acute


Plan:  --s/p 2 units pRBC





(3) elevated serum ferritin


Plan:  --due to hemochromatosis or due to liver injury.


 





Assessment


84y/o female admitted with weakness, fatigue.  Hematology consulted for 

evaluation of leukocytosis and anemia. 


h/o Hypertension, hemochromatosis, atrial fibrillation.


Attending Statement


The exam, history, and the medical decision-making described in the above note 

were completed with the assistance of the mid-level provider. I reviewed and 

agree with the findings presented.  I attest that I had a face-to-face 

encounter with the patient on the same day, and personally performed and 

documented my assessment and findings in the medical record.





No new c/o


feels better.


YAZMIN-2 and BCR are pending


HFE is pending.


D/W admitting doctor. OK to d/c . FU as outpt.





Problem Qualifiers





(1) Leukocytosis:  


Qualified Codes:  D72.825 - Bandemia








Mary Rousseau Mar 20, 2018 12:47


Teto Wasserman MD Mar 20, 2018 16:11

## 2018-03-20 NOTE — HHI.DS
__________________________________________________





Discharge Summary


Admission Date


Mar 15, 2018 at 17:07


Discharge Date:  Mar 20, 2018


Admitting Diagnosis





Symptomatic anemia/ syncope /leukocytosis





(1) Exertional dyspnea


ICD Code:  R06.09 - Other forms of dyspnea


Diagnosis:  Principal





(2) Atrial fibrillation with RVR


ICD Code:  I48.91 - Unspecified atrial fibrillation


(3) Symptomatic anemia


ICD Code:  D64.9 - Anemia, unspecified


Status:  Acute


(4) Chronic anticoagulation


ICD Code:  Z79.01 - Long term (current) use of anticoagulants


(5) Transaminitis


ICD Code:  R74.0 - Nonspecific elevation of levels of transaminase and lactic 

acid dehydrogenase [LDH]


(6) Hypertension


ICD Code:  I10 - Essential (primary) hypertension


(7) Leukocytosis


ICD Code:  D72.829 - Elevated white blood cell count, unspecified


Status:  Acute


(8) Abnormal CT scan, bladder


ICD Code:  R93.41 - Abnormal radiologic findings on diagnostic imaging of renal 

pelvis, ureter, or bladder


(9) Pulmonary hypertension


ICD Code:  I27.20 - Pulmonary hypertension, unspecified


Status:  Chronic


(10) Acute diastolic heart failure


ICD Code:  I50.31 - Acute diastolic (congestive) heart failure


Status:  Acute


(11) Hypokalemia


ICD Code:  E87.6 - Hypokalemia


Status:  Acute


Procedures


None


Brief History - From Admission


This is an 83-year-old female with past medical history of hypertension, atrial 

fibrillation, hemochromatosis, on chronic Coumadin who presented with 

exertional dyspnea.  Patient stated that about 3 weeks ago she had a virus in 

which she had GI symptoms such as nausea vomiting.  She stated that resolved 

she started feeling better but a few days ago symptoms recur.  She denies any 

abdominal pain.  Denies any urinary symptoms.  Patient then stated that she 

felt very fatigued and with exertion she felt very short of breath.  Patient 

stated that she never had these symptoms before.  She denies any chest pain, 

palpitation, lightheadedness dizziness.  Patient stated that symptoms were 

significant enough for her to come to emergency department.  She denies any GI 

bleed.  Patient sees a cardiologist up [place].  Denies any fevers or chills.  

Denies any cough.





All other review of system reviewed and negative.





During my interview with patient heart rate was above 100 during the majority 

of the interview and went up to 150.  Patient stated that she took her morning 

medication today.


CBC/BMP:  


3/20/18 1210                                                                   

             3/19/18 1015





Significant Findings





Laboratory Tests








Test


  3/18/18


07:36 3/19/18


10:15 3/19/18


12:13 3/20/18


06:02


 


White Blood Count


  21.1 TH/MM3


(4.0-11.0) 26.7 TH/MM3


(4.0-11.0) 


  


 


 


Red Blood Count


  3.20 MIL/MM3


(4.00-5.30) 3.28 MIL/MM3


(4.00-5.30) 


  


 


 


Hemoglobin


  10.3 GM/DL


(11.6-15.3) 10.5 GM/DL


(11.6-15.3) 


  


 


 


Hematocrit


  30.4 %


(35.0-46.0) 31.4 %


(35.0-46.0) 


  


 


 


Red Cell Distribution Width


  17.8 %


(11.6-17.2) 18.2 %


(11.6-17.2) 


  


 


 


Monocytes % 19 % (0-8)  18 % (0-8)   


 


Neutrophils # (Manual)


  14.8 TH/MM3


(1.8-7.7) 18.4 TH/MM3


(1.8-7.7) 


  


 


 


Metamyelocytes 3 % (0-1)    


 


Myelocytes 2 % (0-0)  3 % (0-0)   


 


Platelet Morphology Comment


  ENLARGED


(NORMAL) ENLARGED


(NORMAL) 


  


 


 


Prothrombin Time


  21.5 SEC


(9.8-11.6) 


  18.4 SEC


(9.8-11.6) 


 


 


Albumin


  2.7 GM/DL


(3.4-5.0) 2.6 GM/DL


(3.4-5.0) 


  


 


 


Calcium Level


  8.1 MG/DL


(8.5-10.1) 7.9 MG/DL


(8.5-10.1) 


  


 


 


Phosphorus Level


  1.7 MG/DL


(2.5-4.9) 


  


  


 


 


Alkaline Phosphatase


  172 U/L


() 181 U/L


() 


  


 


 


Aspartate Amino Transf


(AST/SGOT) 53 U/L (15-37) 


  


  


  


 


 


Alanine Aminotransferase


(ALT/SGPT) 125 U/L


(10-53) 96 U/L (10-53) 


  


  


 


 


Total Bilirubin


  3.0 MG/DL


(0.2-1.0) 2.0 MG/DL


(0.2-1.0) 


  


 


 


Potassium Level


  2.9 MEQ/L


(3.5-5.1) 


  


  


 


 


Lymphocytes (%) (Auto)


  


  8.7 %


(9.0-44.0) 


  


 


 


Monocytes (%) (Auto)


  


  22.2 %


(0.0-8.0) 


  


 


 


Neutrophils # (Auto)


  


  17.8 TH/MM3


(1.8-7.7) 


  


 


 


Monocytes # (Auto)


  


  5.9 TH/MM3


(0-0.9) 


  


 


 


Eosinophils # (Auto)


  


  0.5 TH/MM3


(0-0.4) 


  


 


 


Platelet Estimate  LOW (NORMAL)   


 


Random Glucose


  


  150 MG/DL


() 


  


 


 


Sodium Level


  


  135 MEQ/L


(136-145) 


  


 


 


Estimat Glomerular Filtration


Rate 


  82 ML/MIN


(>89) 


  


 


 


C-Reactive Protein


  


  


  


  4.40 MG/DL


(0.00-0.30)


 


Test


  3/20/18


09:06 3/20/18


12:10 


  


 


 


Prothrombin Time


  17.5 SEC


(9.8-11.6) 


  


  


 


 


White Blood Count


  


  30.0 TH/MM3


(4.0-11.0) 


  


 


 


Red Blood Count


  


  3.14 MIL/MM3


(4.00-5.30) 


  


 


 


Hemoglobin


  


  9.9 GM/DL


(11.6-15.3) 


  


 


 


Hematocrit


  


  30.0 %


(35.0-46.0) 


  


 


 


Red Cell Distribution Width


  


  17.8 %


(11.6-17.2) 


  


 


 


Monocytes %  14 % (0-8)   


 


Neutrophils # (Manual)


  


  21.0 TH/MM3


(1.8-7.7) 


  


 


 


Myelocytes  3 % (0-0)   


 


Platelet Morphology Comment


  


  ENLARGED


(NORMAL) 


  


 








Imaging





Last Impressions








Liver Ultrasound 3/16/18 0000 Signed





Impressions: 





 Service Date/Time:  Friday, March 16, 2018 09:51 - CONCLUSION:  1. Numerous 





 gallstones with sludge but without biliary ductal dilatation or gallbladder 

wall 





 thickening. No free fluid.     Rogerio Parks MD 


 


Abdomen/Pelvis CT 3/15/18 1712 Signed





Impressions: 





 Service Date/Time:  Thursday, March 15, 2018 18:09 - CONCLUSION:  1. Air in 

the 





 urinary bladder including some in the right wall of the bladder. This is 





 nonspecific but the differential would include emphysematous cystitis but this 





 is considered somewhat unlikely as I don't see significant wall thickening or 





 high-grade inflammatory changes. A colovesical fistula would also be in the 





 differential. Diverticulosis without definite diverticulitis seen of the 





 adjacent sigmoid colon. Finally, as there is age indeterminate but potentially 





 acute left inferior pubic ramus fracture, the air in the bladder could be 





 posttraumatic. If so, only a small amount of urine has leaked. 2. Suspected 





 venous congestion of the liver. Otherwise, solid organs are within normal 





 limits.     Anderson Weldon MD 


 


CT Angiography 3/15/18 1702 Signed





Impressions: 





 Service Date/Time:  Thursday, March 15, 2018 18:09 - CONCLUSION:  1. No 





 pulmonary embolus. 2. Small to moderate bilateral pleural effusions. 3. Mild 





 dependent/compressive atelectasis of both bases. 4. Mild biatrial enlargement 

of 





 the heart. 5. Coronary artery calcification. 6. Nonspecific mediastinal 





 lymphadenopathy.      Anderson Weldon MD 


 


Head CT 3/15/18 1512 Signed





Impressions: 





 Service Date/Time:  Thursday, March 15, 2018 15:46 - CONCLUSION:  1. Mild 





 periventricular white matter small vessel ischemic changes bilaterally.  2. 

Mild 





 cerebral atrophy.  3. 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 





 ganglia and right cerebellar hemisphere.  4. No acute infarct, acute hemorrhage

, 





 midline shift or extra-axial fluid collections.     Pelon García MD 


 


Chest X-Ray 3/15/18 1512 Signed





Impressions: 





 Service Date/Time:  Thursday, March 15, 2018 15:31 - CONCLUSION:  Bibasilar 





 patchiness consistent with atelectasis and/or infiltrates.     Pelon García MD 








PE at Discharge


Awake and alert and oriented 3.





Acute distress.


Breathing is nonlabored.


S1-S2 Irregular rate or rythm


Abdomen is soft, nt, nd


no edema in lower extremities


Pt update on day of discharge


The patient denies chest pain or shortness of breath.  Denies fevers or chills.

  No dysuria, denies diarrhea, cough.  WBC seems to be trending up.


Pt Condition on Discharge:  Stable


Discharge Disposition:  Disch w/ Home Health Serv


Discharge Time:  > 30 minutes


Discharge Instructions


DIET: Follow Instructions for:  As Tolerated, No Restrictions


Activities you can perform:  Regular-No Restrictions


Activities to Avoid:  Strenuous Activity


Follow up Referrals:  


Cardiology - 2 Weeks with Bal Clayton MD


Oncology/Hematology - 2 Weeks with Teto Wasserman MD


PCP Follow-up - 2 Weeks


Pulmonology - 2 Weeks





New Medications:  


Oxygen (O2) (Oxygen (O2))  Device


LITER KONSTANTIN.CANULA CONTINUOUS for Prevent Hypoxemia, #2


Oxygen Concentrator Portable Gaseous


 2 L/min via Nasal Canula Continuous


 For 99 months


 


Continued Medications:  


Diltiazem ER 24 HR (Cartia Xt) 120 Mg Caper


120 MG PO DAILY, #30 CAP 0 Refills





Estrogens, Conjugated (Premarin) 0.3 Mg Tab


0.3 MG PO DAILY for Estrogen Supplements, #30 TAB 0 Refills





Metoprolol Succinate/HCTZ 100-12.5 ER (Metoprolol Succinate/HCTZ 100-12.5 ER) 

100 Mg-12.5 Mg Tab


1 TAB PO DAILY, #30 TAB 0 Refills





Omeprazole (Omeprazole) 20 Mg Tab


20 MG PO DAILY, #30 TAB 0 Refills





Valsartan (Valsartan) 320 Mg Tab


320 MG PO DAILY, #30 TAB 0 Refills





Warfarin (Warfarin) 2.5 Mg Tab


2.5 MG PO DAILY for Blood Clot Prevention, #30 TAB 0 Refills

















Cem Gordon MD Mar 20, 2018 15:26

## 2018-03-20 NOTE — MB
cc:

Teto Wasserman MD

****

 

 

DATE OF CONSULT:

03/19/2018

 

REASON FOR CONSULTATION:

Consult requested by Hospitalist for evaluation of leukocytosis and 

anemia.

 

HISTORY OF PRESENT ILLNESS:

This is an 83-year-old, very pleasant white female.  She stated when 

she was in [place] last January, 2 months ago, she saw a hematologist

for hemochromatosis.  She recalled that she was told that her white 

cells were high.  She was advised to have further workup, but she 

deferred this.  She stated that she was coming to [place] and she 

would have that done here.

 

REVIEW OF SYSTEMS:

The patient came into the emergency room for extreme weakness, 

tiredness and fatigue.  She was lightheaded.  In the ER, the patient 

was evaluated.  She was found to have severe anemia and she was 

admitted to the hospital.  She has received blood transfusion.  The 

rest of the review of systems is negative.

 

PAST MEDICAL HISTORY:

Hypertension, hemochromatosis, atrial fibrillation.

 

PAST SURGICAL HISTORY:

Hip replacement, total hysterectomy, left lower leg vein stripping.

 

MEDICATIONS:

1.  Warfarin.

2.  Valsartan.

3.  Premarin.

4.  Omeprazole.

5.  Metoprolol.

6.  Cartia.

 

ALLERGIES:

PENICILLIN.  SULFA.   CIPRO.

 

FAMILY HISTORY:

Noncontributory.

 

SOCIAL HISTORY:

The patient does not smoke cigarettes, does not drink alcohol.

 

PHYSICAL EXAMINATION:

GENERAL:   A well-developed, well-nourished white female.

VITAL SIGNS:  Temperature 99.6, heart rate 99, blood pressure 156/84, 

O2 saturation 93 percent.

HEAD, EYES, EARS, NOSE, AND THROAT:  Pupils equal, round, reactive to 

light and accommodation, extraocular movements intact.  Anicteric.  No

oral lesions noted.  No thrush noted.

NECK:  Supple.  No JVD.  No masses noted.

LUNGS:  Decreased breath sounds on both sides.

HEART:  Regular rate and rhythm.  No murmur heard.

ABDOMEN:  Soft and nontender.  No hepatosplenomegaly.  No abnormal 

bowel sounds.  No guarding or rigidity noted.

EXTREMITIES:  No pedal edema.  No cyanosis, no clubbing.

NEUROLOGIC:  Awake, alert, oriented x 3.  Sensory and motor seem to be

intact.

SKIN:  No bruises or petechiae noted.

LYMPH NODES:  No cervical, supraclavicular, or axillary 

lymphadenopathy noted.

BACK:  There is no spinal tenderness noted.

 

ASSESSMENT:

1.  Leukocytosis with a left shift.  This is probably due to 

myeloproliferative disorder or reactive process.

2.  Elevated serum ferritin level either due to hemochromatosis or due

to liver injury.

 

PLAN:

I have reviewed her available records and I have discussed with the 

patient regarding the persistent leukocytosis.  She said that she was 

told 2 months ago when she was in [place] that her white count has 

been running high.  She did not have any workup done in the past.

  

When she came into the hospital on 03/15/2018, white count was 19.1, 

hemoglobin 7.9, .2, platelet count was 201.  She had received 2

units of blood transfusion and her hemoglobin has improved to 10.5.  

Her white count has gone up from 19.1 to 26.7.  The differential count 

shows neutrophilia, monocytosis, basophilia.  She also has myelocytes 

and metamyelocytes.  I suspect that she has myeloproliferative 

disorder.  However, reactive leukocytosis cannot be ruled out at this 

time.

 

My recommendation is to check the BCR-ABL by FISH,  JAK2 mutation, 

sedimentation rate and C-reactive protein.  We will also get 

hemochromatosis gene analysis for hemochromatosis.  Further 

recommendations once we have the results of the above test.

 

Thank you for asking my opinion.

 

 

__________________________________

MD CARINA Gorman/REYNALDO

D: 03/19/2018, 10:20 PM

T: 03/20/2018, 08:01 AM

Visit #: [id number]

Job #: [id number]

DMITRY

## 2018-03-21 VITALS — HEART RATE: 112 BPM

## 2018-03-21 VITALS
TEMPERATURE: 99 F | OXYGEN SATURATION: 97 % | HEART RATE: 102 BPM | RESPIRATION RATE: 19 BRPM | DIASTOLIC BLOOD PRESSURE: 73 MMHG | SYSTOLIC BLOOD PRESSURE: 130 MMHG

## 2018-03-21 VITALS
OXYGEN SATURATION: 98 % | TEMPERATURE: 98.9 F | DIASTOLIC BLOOD PRESSURE: 72 MMHG | RESPIRATION RATE: 19 BRPM | SYSTOLIC BLOOD PRESSURE: 140 MMHG | HEART RATE: 98 BPM

## 2018-03-21 VITALS
HEART RATE: 96 BPM | RESPIRATION RATE: 20 BRPM | TEMPERATURE: 99 F | DIASTOLIC BLOOD PRESSURE: 65 MMHG | OXYGEN SATURATION: 93 % | SYSTOLIC BLOOD PRESSURE: 134 MMHG

## 2018-03-21 VITALS
RESPIRATION RATE: 20 BRPM | DIASTOLIC BLOOD PRESSURE: 98 MMHG | OXYGEN SATURATION: 97 % | SYSTOLIC BLOOD PRESSURE: 155 MMHG | HEART RATE: 103 BPM | TEMPERATURE: 98.4 F

## 2018-03-21 VITALS
DIASTOLIC BLOOD PRESSURE: 76 MMHG | RESPIRATION RATE: 19 BRPM | TEMPERATURE: 99.1 F | HEART RATE: 96 BPM | OXYGEN SATURATION: 99 % | SYSTOLIC BLOOD PRESSURE: 155 MMHG

## 2018-03-21 LAB
INR PPP: 1.7 RATIO
PROTHROMBIN TIME: 17.6 SEC (ref 9.8–11.6)

## 2018-03-21 RX ADMIN — PANTOPRAZOLE SODIUM SCH MG: 20 TABLET, DELAYED RELEASE ORAL at 09:20

## 2018-03-21 RX ADMIN — METOPROLOL SUCCINATE SCH MG: 50 TABLET, FILM COATED, EXTENDED RELEASE ORAL at 09:20

## 2018-03-21 RX ADMIN — AZTREONAM SCH MLS/HR: 1 INJECTION, POWDER, LYOPHILIZED, FOR SOLUTION INTRAMUSCULAR; INTRAVENOUS at 04:05

## 2018-03-21 RX ADMIN — AZTREONAM SCH MLS/HR: 1 INJECTION, POWDER, LYOPHILIZED, FOR SOLUTION INTRAMUSCULAR; INTRAVENOUS at 12:53

## 2018-03-21 RX ADMIN — VALSARTAN SCH MG: 160 TABLET ORAL at 09:20

## 2018-03-21 RX ADMIN — WARFARIN SODIUM SCH MG: 2.5 TABLET ORAL at 16:57

## 2018-03-21 RX ADMIN — SODIUM PHOSPHATE, DIBASIC, ANHYDROUS, POTASSIUM PHOSPHATE, MONOBASIC, AND SODIUM PHOSPHATE, MONOBASIC, MONOHYDRATE SCH MG: 852; 155; 130 TABLET, COATED ORAL at 04:53

## 2018-03-21 RX ADMIN — DILTIAZEM HYDROCHLORIDE SCH MG: 120 CAPSULE, EXTENDED RELEASE ORAL at 09:21

## 2018-03-21 RX ADMIN — FUROSEMIDE SCH MG: 10 INJECTION, SOLUTION INTRAMUSCULAR; INTRAVENOUS at 09:00

## 2018-03-21 RX ADMIN — Medication SCH ML: at 09:21

## 2018-03-21 RX ADMIN — SODIUM PHOSPHATE, DIBASIC, ANHYDROUS, POTASSIUM PHOSPHATE, MONOBASIC, AND SODIUM PHOSPHATE, MONOBASIC, MONOHYDRATE SCH MG: 852; 155; 130 TABLET, COATED ORAL at 12:53

## 2018-03-21 NOTE — HHI.PR
Subjective


Remarks


The patient denies chest pain or shortness of breath.


Denies fevers or chills.





Objective


Vitals





Vital Signs








  Date Time  Temp Pulse Resp B/P (MAP) Pulse Ox O2 Delivery O2 Flow Rate FiO2


 


3/21/18 12:03 99.0 102 19 130/73 (92) 97   


 


3/21/18 09:15      Nasal Cannula 2.00 


 


3/21/18 08:03 99.1 96 19 155/76 (102) 99   


 


3/21/18 04:03 98.4 103 20 155/98 (117) 97   


 


3/21/18 04:03      Nasal Cannula 2.00 


 


3/21/18 03:48  112      


 


3/21/18 00:00      Nasal Cannula 2.00 


 


3/21/18 00:00 99.0 96 20 134/65 (88) 93   


 


3/20/18 23:56  103      


 


3/20/18 20:00 99.3 109 20 121/75 (90) 95   


 


3/20/18 20:00      Nasal Cannula 2.00 


 


3/20/18 19:42  103      














I/O      


 


 3/20/18 3/20/18 3/20/18 3/21/18 3/21/18 3/21/18





 07:00 15:00 23:00 07:00 15:00 23:00


 


Intake Total 120 ml 580 ml    


 


Output Total 200 ml     


 


Balance -80 ml 580 ml    


 


      


 


Intake Oral 120 ml 480 ml    


 


IV Total  100 ml    


 


Output Urine Total 200 ml     


 


# Voids  6   3 


 


# Bowel Movements 0 3    








Result Diagram:  


3/20/18 1210                                                                   

             3/19/18 1015





Objective Remarks


Awake and alert and oriented 3.





Acute distress.


Breathing is nonlabored.


S1-S2 Irregular rate or rythm


Abdomen is soft, nt, nd


no edema in lower extremities


Procedures


None





A/P


Problem List:  


(1) Exertional dyspnea


ICD Code:  R06.09 - Other forms of dyspnea


Plan:  CTA negative for PE.  Small to moderate bilateral pleural effusions.  

Mild dependent/compressive atelectasis at both bases.  Mild biatrial 

enlargement of the heart.


Likely multifactorial etiology from atrial fibrillation with RVR, symptomatic 

anemia and acute diastolic heart failure.


Echocardiogram shows EF of 50%, no definite regional wall motion abnormality, 

left atrial size is moderately dilated, right atrial size is mild to moderately 

dilated, moderate mitral annular calcification present, moderate mitral valve 

regurgitation, trileaflet aortic valve but no stenosis or regurgitation.  

Moderate to severe tricuspid regurgitation.  Pulmonary hypertension.


Continue supplemental oxygen to keep oxygen saturation more than 92%.





(2) Atrial fibrillation with RVR


ICD Code:  I48.91 - Unspecified atrial fibrillation


Plan:  Patient presented with heart rate between 80 and 150 bpm.


Continue home medications.


On Coumadin with therapeutic INR.


3/18 Heart rate controlled.





(3) Symptomatic anemia


ICD Code:  D64.9 - Anemia, unspecified


Status:  Acute


Plan:  Patient has history of chronic anemia however as per patient report last 

hemoglobin was 11.


Status post transfusion of 2 units of packed red blood cells with appropriate 

hemoglobin response from 7.9-10.2.


Iron studies consistent with hemochromatosis with increased iron, low TIBC, 

increased percent saturation of iron, elevated ferritin.


Check stool for Hemoccult blood --> ordered and pending.





(4) Chronic anticoagulation


ICD Code:  Z79.01 - Long term (current) use of anticoagulants


Plan:  INR therapeutic.


Continue to monitor PT and INR daily.


Continue Coumadin.


Dose as per Pharmacy recommendations





(5) Transaminitis


ICD Code:  R74.0 - Nonspecific elevation of levels of transaminase and lactic 

acid dehydrogenase [LDH]


Plan:  Liver ultrasound shows numerous gallstones with sludge but without 

biliary ductal dilatation or gallbladder wall thickening.


3/18 AST and ALT are trending down.  ALT on admission was 378, down to 125.  

AST is also trending down from 455 down to 53.


Continue to monitor liver function tests.


3/19 Transaminases continue to improve. Hepatitis profile negative.





(6) Hypertension


ICD Code:  I10 - Essential (primary) hypertension


Plan:  Currently on Valsartan, HCTZ, Metoprolol Succinate and Diltiazem.


3/18 BP low today at 91/50, cut Valsartan dose to 160 mg po daily, DC HCTZ.  

Monitor vital signs.


3/19 BP improved. Monitor BP.





(7) Leukocytosis


ICD Code:  D72.829 - Elevated white blood cell count, unspecified


Status:  Acute


Plan:   with bands of 12%.


Check urinalysis.


WBCs trending down, continue to monitor.


Currently on IV Aztreonam - unclear what infections is being treated.


3/18 UA negative.  WBC trended up to 21K.  However, patient is a febrile and 

denies diarrhea or other signs of infection.  Continue to monitor CBC with 

differential.


3/19 WBC continues to trend up now 20 6K.  However there are no signs of 

infection is still.  The patient denies diarrhea, UA negative.  Leukocytosis 

with predominance of monocytes and some teardrop cells observed on the smear.  

I will consult hematology for further recommendations.


3/21` WBC trending up at 30 K on 3/20.  Discussed the case with Dr. Wasserman 

for hematology.  The patient possibly has a myeloproliferative disorder.  Follow

-up as outpatient with hematology.  The case was discussed with Dr. Nicole.





(8) Abnormal CT scan, bladder


ICD Code:  R93.41 - Abnormal radiologic findings on diagnostic imaging of renal 

pelvis, ureter, or bladder


Plan:  CT of the abdomen and pelvis describes air in the urinary bladder.  

Differential diagnosis includes for similar cystitis or colovesicular fistula.


3/18 appreciate urology recommendations.  Urinalysis negative.  UTI ruled out.  

Continue to monitor CBC with differential.





(9) Pulmonary hypertension


ICD Code:  I27.20 - Pulmonary hypertension, unspecified


Status:  Chronic


Plan:  As seen on echocardiogram described above.


Cardiology consulted.


Continue diuresis.


3/19 appreciate pulmonary assistance.  Discussed the case with Dr. Toth.  He 

recommends a right heart catheterization to check the patient's wedge pressure 

and depending on the results the next step could be decided.


Follow-up with pulmonology and cardiology as an outpatient.





(10) Acute diastolic heart failure


ICD Code:  I50.31 - Acute diastolic (congestive) heart failure


Status:  Acute


Plan:  BL pleural effusions on CTA


Pulm htn


echo w ef 50 %


Continur furosemide - Dose increased as per cardiology to 40 mg IV BID


DC IV furosemide, start oral furosemide.





(11) Hypokalemia


ICD Code:  E87.6 - Hypokalemia


Status:  Acute


Plan:  Likely due to potassium excretion with furosemide use.


We will replace IV and orally, continue to monitor BMP and replace as needed.


3/19 hypokalemia resolved.  Monitor BMP and replace as needed.





Assessment and Plan


DVT prophylaxis: on coumadin


Discharge Planning


Discharge home with home O2 when arrangements made.





Problem Qualifiers





(1) Hypertension:  


Qualified Codes:  I10 - Essential (primary) hypertension


(2) Leukocytosis:  


Qualified Codes:  D72.825 - Bandemia








Cem Gordon MD Mar 21, 2018 16:46

## 2018-03-21 NOTE — PD.ONC.PN
Subjective


Subjective


Remarks


Afebrile overnight. 


patient resting in room with  at bedside. 


she has no complaints. 


she wants to know when she is going home. 


denies pain at present.





Objective


Data











  Date Time  Temp Pulse Resp B/P (MAP) Pulse Ox O2 Delivery O2 Flow Rate FiO2


 


3/21/18 09:15      Nasal Cannula 2.00 


 


3/21/18 08:03 99.1 96 19 155/76 (102) 99   


 


3/21/18 04:03 98.4 103 20 155/98 (117) 97   


 


3/21/18 04:03      Nasal Cannula 2.00 


 


3/21/18 03:48  112      


 


3/21/18 00:00      Nasal Cannula 2.00 


 


3/21/18 00:00 99.0 96 20 134/65 (88) 93   


 


3/20/18 23:56  103      


 


3/20/18 20:00 99.3 109 20 121/75 (90) 95   


 


3/20/18 20:00      Nasal Cannula 2.00 


 


3/20/18 19:42  103      


 


3/20/18 16:04      Nasal Cannula 2.00 


 


3/20/18 16:00  103      


 


3/20/18 16:00 98.8 88 20 138/79 (98) 95   








Result Diagram:  


3/20/18 1210                                                                   

             3/19/18 1015





Laboratory Results





Laboratory Tests








Test


  3/21/18


06:03


 


Prothrombin Time 17.6 SEC 


 


Prothromb Time International


Ratio 1.7 RATIO 


 











Administered Medications








 Medications


  (Trade)  Dose


 Ordered  Sig/Kellie


 Route


 PRN Reason  Start Time


 Stop Time Status Last Admin


Dose Admin


 


 Sodium Chloride


  (NS Flush)  2 ml  BID


 IV FLUSH


   3/15/18 21:00


    3/21/18 09:21


 


 


 Pantoprazole


 Sodium


  (Protonix)  20 mg  DAILY


 PO


   3/16/18 09:00


    3/21/18 09:20


 


 


 Estrogens


 Conjugated


  (Premarin)  0.3 mg  DAILY


 PO


   3/16/18 09:00


   Future Hold 3/16/18 09:00


 


 


 Warfarin Sodium


  (Coumadin)  2.5 mg  DAILY@1600


 PO


   3/17/18 16:00


    3/20/18 16:23


 


 


 Aztreonam 1000 mg/


 Sodium Chloride  100 ml @ 


 200 mls/hr  Q8H


 IV


   3/15/18 20:00


    3/21/18 04:05


 


 


 Diltiazem HCl


  (Cardizem)  30 mg  Q6HR  PRN


 PO


 HR>100  3/15/18 18:00


    3/15/18 20:45


 


 


 Metoprolol


 Succinate


  (Toprol Xl)  100 mg  DAILY


 PO


   3/16/18 09:00


    3/21/18 09:20


 


 


 Diltiazem HCl


  (Cardizem Cd)  120 mg  DAILY


 PO


   3/19/18 09:00


    3/21/18 09:21


 


 


 Valsartan


  (Diovan)  160 mg  DAILY


 PO


   3/19/18 09:00


    3/21/18 09:20


 


 


 Potassium Phos/


 Sodium Phos


  (K-Phos Neutral)  250 mg  Q8HR


 PO


   3/18/18 14:00


    3/21/18 04:53


 








Objective Remarks


GENERAL: This is a pleasant elderly female, sitting up in bed with  at 

bedside. she appears relaxed and comfortable. 


SKIN: Warm and dry.


HEAD: Normocephalic.


EYES: No injection or drainage. 


NECK: Supple, trachea midline. 


CARDIOVASCULAR: Regular rate and rhythm


RESPIRATORY: Breath sounds equal bilaterally. No accessory muscle use.


GASTROINTESTINAL: Abdomen soft, non-tender, nondistended. 


EXTREMITIES: No cyanosis


NEUROLOGICAL: awake, alert. normal speech.





Assessment/Plan


Problem List:  


(1) Leukocytosis


ICD Codes:  D72.829 - Elevated white blood cell count, unspecified


Status:  Acute


Plan:  3/21: WBC remains elevated, discussed with patient and  waiting 

on lab results. patient clear for discharge from oncology perspective. she will 

need to come to clinic to review the results. fs has been faxed to new patient 

referrals. oncology will sign off.


--Leukocytosis with a left shift.  


--differential includes myeloproliferative disorder vs reactive process.





(2) Symptomatic anemia


ICD Codes:  D64.9 - Anemia, unspecified


Status:  Acute


Plan:  --s/p 2 units pRBC





(3) elevated serum ferritin


Plan:  --due to hemochromatosis or due to liver injury.


 





Assessment


84y/o female admitted with weakness, fatigue.  Hematology consulted for 

evaluation of leukocytosis and anemia. 


h/o Hypertension, hemochromatosis, atrial fibrillation.


Attending Statement


The exam, history, and the medical decision-making described in the above note 

were completed with the assistance of the mid-level provider. I reviewed and 

agree with the findings presented.  I attest that I had a face-to-face 

encounter with the patient on the same day, and personally performed and 

documented my assessment and findings in the medical record.





NO new c/o


feels better.


Miguel- 2/ BCR-Abl / HFE are still pending


Ok to d/c


FU as outpt.


sign off





Problem Qualifiers





(1) Leukocytosis:  


Qualified Codes:  D72.825 - Bandemia








Mary Rousseau Mar 21, 2018 12:56


Teto Wasserman MD Mar 21, 2018 16:06

## 2018-03-29 LAB
HFE GENE MUT ANL BLD/T: (no result)
HFE GENE MUT ANL BLD/T: (no result)
SPECIMEN SOURCE: (no result)

## 2018-04-09 ENCOUNTER — HOSPITAL ENCOUNTER (INPATIENT)
Dept: HOSPITAL 17 - NEPC | Age: 83
LOS: 3 days | DRG: 814 | End: 2018-04-12
Attending: HOSPITALIST | Admitting: HOSPITALIST
Payer: MEDICARE

## 2018-04-09 VITALS
SYSTOLIC BLOOD PRESSURE: 121 MMHG | RESPIRATION RATE: 20 BRPM | OXYGEN SATURATION: 97 % | TEMPERATURE: 97.1 F | DIASTOLIC BLOOD PRESSURE: 72 MMHG | HEART RATE: 81 BPM

## 2018-04-09 VITALS
OXYGEN SATURATION: 98 % | HEART RATE: 69 BPM | DIASTOLIC BLOOD PRESSURE: 65 MMHG | RESPIRATION RATE: 12 BRPM | SYSTOLIC BLOOD PRESSURE: 130 MMHG

## 2018-04-09 VITALS
RESPIRATION RATE: 16 BRPM | HEART RATE: 85 BPM | DIASTOLIC BLOOD PRESSURE: 72 MMHG | TEMPERATURE: 97.9 F | OXYGEN SATURATION: 94 % | SYSTOLIC BLOOD PRESSURE: 153 MMHG

## 2018-04-09 VITALS
SYSTOLIC BLOOD PRESSURE: 175 MMHG | RESPIRATION RATE: 20 BRPM | DIASTOLIC BLOOD PRESSURE: 95 MMHG | TEMPERATURE: 97.8 F | OXYGEN SATURATION: 98 % | HEART RATE: 92 BPM

## 2018-04-09 VITALS
OXYGEN SATURATION: 100 % | HEART RATE: 75 BPM | DIASTOLIC BLOOD PRESSURE: 60 MMHG | RESPIRATION RATE: 18 BRPM | SYSTOLIC BLOOD PRESSURE: 121 MMHG

## 2018-04-09 VITALS
RESPIRATION RATE: 18 BRPM | TEMPERATURE: 97.6 F | OXYGEN SATURATION: 96 % | HEART RATE: 78 BPM | SYSTOLIC BLOOD PRESSURE: 123 MMHG | DIASTOLIC BLOOD PRESSURE: 90 MMHG

## 2018-04-09 VITALS
TEMPERATURE: 97.1 F | HEART RATE: 81 BPM | SYSTOLIC BLOOD PRESSURE: 121 MMHG | DIASTOLIC BLOOD PRESSURE: 72 MMHG | OXYGEN SATURATION: 97 % | RESPIRATION RATE: 20 BRPM

## 2018-04-09 VITALS
RESPIRATION RATE: 18 BRPM | OXYGEN SATURATION: 94 % | DIASTOLIC BLOOD PRESSURE: 92 MMHG | TEMPERATURE: 98.7 F | HEART RATE: 115 BPM | SYSTOLIC BLOOD PRESSURE: 166 MMHG

## 2018-04-09 VITALS
TEMPERATURE: 97.7 F | DIASTOLIC BLOOD PRESSURE: 60 MMHG | OXYGEN SATURATION: 98 % | RESPIRATION RATE: 20 BRPM | HEART RATE: 85 BPM | SYSTOLIC BLOOD PRESSURE: 140 MMHG

## 2018-04-09 VITALS — HEIGHT: 63 IN | BODY MASS INDEX: 25.39 KG/M2 | WEIGHT: 143.3 LBS

## 2018-04-09 VITALS
SYSTOLIC BLOOD PRESSURE: 138 MMHG | RESPIRATION RATE: 20 BRPM | TEMPERATURE: 97.2 F | OXYGEN SATURATION: 97 % | HEART RATE: 90 BPM | DIASTOLIC BLOOD PRESSURE: 67 MMHG

## 2018-04-09 VITALS
HEART RATE: 81 BPM | RESPIRATION RATE: 20 BRPM | OXYGEN SATURATION: 97 % | TEMPERATURE: 97.1 F | SYSTOLIC BLOOD PRESSURE: 121 MMHG | DIASTOLIC BLOOD PRESSURE: 72 MMHG

## 2018-04-09 VITALS
SYSTOLIC BLOOD PRESSURE: 134 MMHG | RESPIRATION RATE: 18 BRPM | HEART RATE: 70 BPM | DIASTOLIC BLOOD PRESSURE: 62 MMHG | OXYGEN SATURATION: 99 % | TEMPERATURE: 97.6 F

## 2018-04-09 VITALS — OXYGEN SATURATION: 100 %

## 2018-04-09 VITALS
HEART RATE: 90 BPM | RESPIRATION RATE: 22 BRPM | OXYGEN SATURATION: 97 % | TEMPERATURE: 97.2 F | DIASTOLIC BLOOD PRESSURE: 67 MMHG | SYSTOLIC BLOOD PRESSURE: 138 MMHG

## 2018-04-09 DIAGNOSIS — I46.9: ICD-10-CM

## 2018-04-09 DIAGNOSIS — K72.00: ICD-10-CM

## 2018-04-09 DIAGNOSIS — I50.9: ICD-10-CM

## 2018-04-09 DIAGNOSIS — Z96.649: ICD-10-CM

## 2018-04-09 DIAGNOSIS — R57.0: ICD-10-CM

## 2018-04-09 DIAGNOSIS — M19.90: ICD-10-CM

## 2018-04-09 DIAGNOSIS — E87.2: ICD-10-CM

## 2018-04-09 DIAGNOSIS — N17.9: ICD-10-CM

## 2018-04-09 DIAGNOSIS — R18.8: ICD-10-CM

## 2018-04-09 DIAGNOSIS — E44.0: ICD-10-CM

## 2018-04-09 DIAGNOSIS — I48.91: ICD-10-CM

## 2018-04-09 DIAGNOSIS — J96.01: ICD-10-CM

## 2018-04-09 DIAGNOSIS — K80.20: ICD-10-CM

## 2018-04-09 DIAGNOSIS — I11.0: ICD-10-CM

## 2018-04-09 DIAGNOSIS — R26.2: ICD-10-CM

## 2018-04-09 DIAGNOSIS — Z79.01: ICD-10-CM

## 2018-04-09 DIAGNOSIS — Z90.710: ICD-10-CM

## 2018-04-09 DIAGNOSIS — E83.119: ICD-10-CM

## 2018-04-09 DIAGNOSIS — D72.829: ICD-10-CM

## 2018-04-09 DIAGNOSIS — Z80.6: ICD-10-CM

## 2018-04-09 DIAGNOSIS — D75.9: Primary | ICD-10-CM

## 2018-04-09 DIAGNOSIS — D65: ICD-10-CM

## 2018-04-09 DIAGNOSIS — R73.9: ICD-10-CM

## 2018-04-09 DIAGNOSIS — G93.1: ICD-10-CM

## 2018-04-09 DIAGNOSIS — D50.9: ICD-10-CM

## 2018-04-09 DIAGNOSIS — J96.02: ICD-10-CM

## 2018-04-09 DIAGNOSIS — J94.2: ICD-10-CM

## 2018-04-09 LAB
% SATURATION IRON PROFILE: 99.1 % (ref 20–50)
ALBUMIN SERPL-MCNC: 2.8 GM/DL (ref 3.4–5)
ALP SERPL-CCNC: 295 U/L (ref 45–117)
ALT SERPL-CCNC: 116 U/L (ref 10–53)
AST SERPL-CCNC: 184 U/L (ref 15–37)
BASOPHILS # BLD AUTO: 0.5 TH/MM3 (ref 0–0.2)
BASOPHILS NFR BLD: 2 % (ref 0–2)
BILIRUB INDIRECT SERPL-MCNC: 1.9 MG/DL (ref 0–0.8)
BILIRUB SERPL-MCNC: 5.8 MG/DL (ref 0.2–1)
BUN SERPL-MCNC: 18 MG/DL (ref 7–18)
CALCIUM SERPL-MCNC: 7.9 MG/DL (ref 8.5–10.1)
CHLORIDE SERPL-SCNC: 103 MEQ/L (ref 98–107)
CREAT SERPL-MCNC: 0.8 MG/DL (ref 0.5–1)
CRP SERPL-MCNC: 2.7 MG/DL (ref 0–0.3)
DIRECT BILIRUBIN ADULT: 3.9 MG/DL (ref 0–0.2)
EOSINOPHIL # BLD: 0.3 TH/MM3 (ref 0–0.4)
EOSINOPHIL NFR BLD: 1.5 % (ref 0–4)
ERYTHROCYTE [DISTWIDTH] IN BLOOD BY AUTOMATED COUNT: 20.1 % (ref 11.6–17.2)
ERYTHROCYTE [SEDIMENTATION RATE] IN BLOOD BY WESTERGREN METHOD: 45 MM/HR (ref 0–30)
FERRITIN SERPL-MCNC: 3311 NG/ML (ref 8–252)
GFR SERPLBLD BASED ON 1.73 SQ M-ARVRAT: 69 ML/MIN (ref 89–?)
GLUCOSE SERPL-MCNC: 139 MG/DL (ref 74–106)
HAPTOGLOB SERPL-MCNC: (no result) MG/DL (ref 30–200)
HCO3 BLD-SCNC: 19.8 MEQ/L (ref 21–32)
HCT VFR BLD CALC: 18.9 % (ref 35–46)
HGB BLD-MCNC: 6.2 GM/DL (ref 11.6–15.3)
INR PPP: 3.2 RATIO
IRON (FE): 233 MCG/DL (ref 50–170)
LYMPHOCYTES # BLD AUTO: 2.3 TH/MM3 (ref 1–4.8)
LYMPHOCYTES NFR BLD AUTO: 10.1 % (ref 9–44)
LYMPHOCYTES: 13 % (ref 9–44)
MCH RBC QN AUTO: 32.4 PG (ref 27–34)
MCHC RBC AUTO-ENTMCNC: 32.9 % (ref 32–36)
MCV RBC AUTO: 98.5 FL (ref 80–100)
METAMYELOCYTES NFR BLD: 1 % (ref 0–1)
MONOCYTE #: 5.1 TH/MM3 (ref 0–0.9)
MONOCYTES NFR BLD: 22.6 % (ref 0–8)
MONOCYTES: 16 % (ref 0–8)
MYELOCYTES NFR BLD: 1 % (ref 0–0)
NEUTROPHILS # BLD AUTO: 14.4 TH/MM3 (ref 1.8–7.7)
NEUTROPHILS NFR BLD AUTO: 63.8 % (ref 16–70)
NEUTS BAND # BLD MANUAL: 15.6 TH/MM3 (ref 1.8–7.7)
NEUTS BAND NFR BLD: 14 % (ref 0–6)
NEUTS SEG NFR BLD MANUAL: 53 % (ref 16–70)
PLATELET # BLD: 149 TH/MM3 (ref 150–450)
PMV BLD AUTO: 10.9 FL (ref 7–11)
PROT SERPL-MCNC: 7.4 GM/DL (ref 6.4–8.2)
PROTHROMBIN TIME: 32.2 SEC (ref 9.8–11.6)
RBC # BLD AUTO: 1.91 MIL/MM3 (ref 4–5.3)
RETIC #: 23.7 MIL/L (ref 20–150)
RETICS/RBC NFR: 0.9 % (ref 0.4–3)
RHEUMATOID FACT SER QL LA: NEGATIVE
SCHISTOCYTES BLD QL SMEAR: (no result)
SODIUM SERPL-SCNC: 138 MEQ/L (ref 136–145)
TIBC SERPL-MCNC: 235 MCG/DL (ref 250–450)
TROPONIN I SERPL-MCNC: (no result) NG/ML (ref 0.02–0.05)
WBC # BLD AUTO: 22.6 TH/MM3 (ref 4–11)

## 2018-04-09 PROCEDURE — 82805 BLOOD GASES W/O2 SATURATION: CPT

## 2018-04-09 PROCEDURE — 86920 COMPATIBILITY TEST SPIN: CPT

## 2018-04-09 PROCEDURE — 32551 INSERTION OF CHEST TUBE: CPT

## 2018-04-09 PROCEDURE — 83605 ASSAY OF LACTIC ACID: CPT

## 2018-04-09 PROCEDURE — 83735 ASSAY OF MAGNESIUM: CPT

## 2018-04-09 PROCEDURE — 82948 REAGENT STRIP/BLOOD GLUCOSE: CPT

## 2018-04-09 PROCEDURE — 85610 PROTHROMBIN TIME: CPT

## 2018-04-09 PROCEDURE — 86880 COOMBS TEST DIRECT: CPT

## 2018-04-09 PROCEDURE — 83520 IMMUNOASSAY QUANT NOS NONAB: CPT

## 2018-04-09 PROCEDURE — P9017 PLASMA 1 DONOR FRZ W/IN 8 HR: HCPCS

## 2018-04-09 PROCEDURE — 80048 BASIC METABOLIC PNL TOTAL CA: CPT

## 2018-04-09 PROCEDURE — 88305 TISSUE EXAM BY PATHOLOGIST: CPT

## 2018-04-09 PROCEDURE — 86038 ANTINUCLEAR ANTIBODIES: CPT

## 2018-04-09 PROCEDURE — 84484 ASSAY OF TROPONIN QUANT: CPT

## 2018-04-09 PROCEDURE — 88184 FLOWCYTOMETRY/ TC 1 MARKER: CPT

## 2018-04-09 PROCEDURE — 85384 FIBRINOGEN ACTIVITY: CPT

## 2018-04-09 PROCEDURE — P9016 RBC LEUKOCYTES REDUCED: HCPCS

## 2018-04-09 PROCEDURE — 84100 ASSAY OF PHOSPHORUS: CPT

## 2018-04-09 PROCEDURE — 71045 X-RAY EXAM CHEST 1 VIEW: CPT

## 2018-04-09 PROCEDURE — G0463 HOSPITAL OUTPT CLINIC VISIT: HCPCS

## 2018-04-09 PROCEDURE — 82105 ALPHA-FETOPROTEIN SERUM: CPT

## 2018-04-09 PROCEDURE — 86430 RHEUMATOID FACTOR TEST QUAL: CPT

## 2018-04-09 PROCEDURE — 83540 ASSAY OF IRON: CPT

## 2018-04-09 PROCEDURE — 94002 VENT MGMT INPAT INIT DAY: CPT

## 2018-04-09 PROCEDURE — 93005 ELECTROCARDIOGRAM TRACING: CPT

## 2018-04-09 PROCEDURE — 92950 HEART/LUNG RESUSCITATION CPR: CPT

## 2018-04-09 PROCEDURE — 80076 HEPATIC FUNCTION PANEL: CPT

## 2018-04-09 PROCEDURE — 83615 LACTATE (LD) (LDH) ENZYME: CPT

## 2018-04-09 PROCEDURE — 86255 FLUORESCENT ANTIBODY SCREEN: CPT

## 2018-04-09 PROCEDURE — 83880 ASSAY OF NATRIURETIC PEPTIDE: CPT

## 2018-04-09 PROCEDURE — 99211 OFF/OP EST MAY X REQ PHY/QHP: CPT

## 2018-04-09 PROCEDURE — 83690 ASSAY OF LIPASE: CPT

## 2018-04-09 PROCEDURE — 88264 CHROMOSOME ANALYSIS 20-25: CPT

## 2018-04-09 PROCEDURE — 30233N1 TRANSFUSION OF NONAUTOLOGOUS RED BLOOD CELLS INTO PERIPHERAL VEIN, PERCUTANEOUS APPROACH: ICD-10-PCS | Performed by: EMERGENCY MEDICINE

## 2018-04-09 PROCEDURE — 86900 BLOOD TYPING SEROLOGIC ABO: CPT

## 2018-04-09 PROCEDURE — 80053 COMPREHEN METABOLIC PANEL: CPT

## 2018-04-09 PROCEDURE — 38222 DX BONE MARROW BX & ASPIR: CPT

## 2018-04-09 PROCEDURE — 84165 PROTEIN E-PHORESIS SERUM: CPT

## 2018-04-09 PROCEDURE — 82728 ASSAY OF FERRITIN: CPT

## 2018-04-09 PROCEDURE — 83010 ASSAY OF HAPTOGLOBIN QUANT: CPT

## 2018-04-09 PROCEDURE — 86901 BLOOD TYPING SEROLOGIC RH(D): CPT

## 2018-04-09 PROCEDURE — 85379 FIBRIN DEGRADATION QUANT: CPT

## 2018-04-09 PROCEDURE — 74181 MRI ABDOMEN W/O CONTRAST: CPT

## 2018-04-09 PROCEDURE — 70450 CT HEAD/BRAIN W/O DYE: CPT

## 2018-04-09 PROCEDURE — 36430 TRANSFUSION BLD/BLD COMPNT: CPT

## 2018-04-09 PROCEDURE — 85027 COMPLETE CBC AUTOMATED: CPT

## 2018-04-09 PROCEDURE — 85007 BL SMEAR W/DIFF WBC COUNT: CPT

## 2018-04-09 PROCEDURE — 77012 CT SCAN FOR NEEDLE BIOPSY: CPT

## 2018-04-09 PROCEDURE — 82272 OCCULT BLD FECES 1-3 TESTS: CPT

## 2018-04-09 PROCEDURE — 88185 FLOWCYTOMETRY/TC ADD-ON: CPT

## 2018-04-09 PROCEDURE — 36556 INSERT NON-TUNNEL CV CATH: CPT

## 2018-04-09 PROCEDURE — 88237 TISSUE CULTURE BONE MARROW: CPT

## 2018-04-09 PROCEDURE — 88311 DECALCIFY TISSUE: CPT

## 2018-04-09 PROCEDURE — 31500 INSERT EMERGENCY AIRWAY: CPT

## 2018-04-09 PROCEDURE — 76377 3D RENDER W/INTRP POSTPROCES: CPT

## 2018-04-09 PROCEDURE — 85730 THROMBOPLASTIN TIME PARTIAL: CPT

## 2018-04-09 PROCEDURE — 85097 BONE MARROW INTERPRETATION: CPT

## 2018-04-09 PROCEDURE — 86850 RBC ANTIBODY SCREEN: CPT

## 2018-04-09 PROCEDURE — 85044 MANUAL RETICULOCYTE COUNT: CPT

## 2018-04-09 PROCEDURE — 86927 PLASMA FRESH FROZEN: CPT

## 2018-04-09 PROCEDURE — 99152 MOD SED SAME PHYS/QHP 5/>YRS: CPT

## 2018-04-09 PROCEDURE — 86140 C-REACTIVE PROTEIN: CPT

## 2018-04-09 PROCEDURE — 88313 SPECIAL STAINS GROUP 2: CPT

## 2018-04-09 PROCEDURE — 71275 CT ANGIOGRAPHY CHEST: CPT

## 2018-04-09 PROCEDURE — 82390 ASSAY OF CERULOPLASMIN: CPT

## 2018-04-09 PROCEDURE — C1830 POWER BONE MARROW BX NEEDLE: HCPCS

## 2018-04-09 PROCEDURE — 82103 ALPHA-1-ANTITRYPSIN TOTAL: CPT

## 2018-04-09 PROCEDURE — 83550 IRON BINDING TEST: CPT

## 2018-04-09 PROCEDURE — 85652 RBC SED RATE AUTOMATED: CPT

## 2018-04-09 RX ADMIN — STANDARDIZED SENNA CONCENTRATE AND DOCUSATE SODIUM SCH TAB: 8.6; 5 TABLET, FILM COATED ORAL at 20:19

## 2018-04-09 RX ADMIN — ONDANSETRON PRN MG: 2 INJECTION, SOLUTION INTRAMUSCULAR; INTRAVENOUS at 07:24

## 2018-04-09 RX ADMIN — DILTIAZEM HYDROCHLORIDE SCH MG: 120 CAPSULE, EXTENDED RELEASE ORAL at 09:05

## 2018-04-09 RX ADMIN — Medication SCH ML: at 08:46

## 2018-04-09 RX ADMIN — Medication SCH ML: at 20:19

## 2018-04-09 RX ADMIN — STANDARDIZED SENNA CONCENTRATE AND DOCUSATE SODIUM SCH TAB: 8.6; 5 TABLET, FILM COATED ORAL at 09:00

## 2018-04-09 NOTE — RADRPT
EXAM DATE/TIME:  04/09/2018 00:00 

 

[facility] COMPARISON:     

No previous studies available for comparison.

 

INDICATIONS :     

Bone marrow biopsy

      

     

      

 

CONSULTATION:

 

Patient has an elevated INR 3.2 and is receiving blood for hemorrhage.  Please reconsult for bone mar
row biopsy the patient clinically stable.  

 

 

 Mikel Felder MD FACR on April 09, 2018 at 10:45           

Board Certified Radiologist.

 This report was verified electronically.

## 2018-04-09 NOTE — RADRPT
EXAM DATE/TIME:  04/09/2018 03:58 

 

[facility] COMPARISON:     

CHEST SINGLE AP, March 15, 2018, 15:31.

 

                     

INDICATIONS :     

Short of breath.

                     

 

MEDICAL HISTORY :     

None.          

 

SURGICAL HISTORY :     

None.   

 

ENCOUNTER:     

Initial                                        

 

ACUITY:     

1 day      

 

PAIN SCORE:     

0/10

 

LOCATION:     

Bilateral  chest

 

FINDINGS:     

A single view of the chest demonstrates bibasilar airspace disease. Heart normal in size.  The cardio
mediastinal contours are unremarkable.  Osseous structures are intact.

 

CONCLUSION:     Bibasilar opacities.

 

 

 

 Ziyad Sexton MD on April 09, 2018 at 4:23           

Board Certified Radiologist.

 This report was verified electronically.

## 2018-04-09 NOTE — EKG
Date Performed: 04/09/2018       Time Performed: 03:34:40

 

PTAGE:      83 years

 

EKG:      ATRIAL FIBRILLATION WITH ABERRANT CONDUCTION OR VENTRICULAR PREMATURE COMPLEXES NONSPECIFIC
 T-WAVE ABNORMALITY Since the previous tracing, no significant change noted ABNORMAL ECG

 

PREVIOUS TRACING       : 03/15/2018 18.30

 

DOCTOR:   Joan Killian  Interpretating Date/Time  04/09/2018 15:20:26

## 2018-04-09 NOTE — PD
HPI


Chief Complaint:  Respiratory Symptoms


Time Seen by Provider:  03:37


Travel History


International Travel<30 days:  No


Contact w/Intl Traveler<30days:  No


Traveled to known affect area:  No





History of Present Illness


HPI


Patient is an 83-year-old female who comes in complaining of shortness of 

breath.  She was admitted to the hospital for similar symptoms about 3 weeks 

ago.  She says she has never really felt better.  She denies any chest pain.  

She says that exertion makes the difficulty breathing worse.  She denies any 

leg swelling, nausea or vomiting.  She denies fever or chills.  Severity is 

mild to moderate.





PFSH


Past Medical History


Hx Anticoagulant Therapy:  Yes (warfarin)


Arthritis:  Yes


Atrial Fibrillation:  Yes


Anxiety:  No


Depression:  No


Cancer:  No


Cardiovascular Problems:  Yes (CHF)


Endocrine:  No


Genitourinary:  No


Hypertension:  Yes


Immune Disorder:  No


Neurologic:  No


Psychiatric:  No


Reproductive:  No


Respiratory:  Yes (sob)


Tetanus Vaccination:  Unknown


Influenza Vaccination:  No


Pregnant?:  Not Pregnant


Menopausal:  Yes





Past Surgical History


Hysterectomy:  Yes


Other Surgery:  Yes (VARICOSE VEIN SX)





Social History


Alcohol Use:  Yes (WINE OCCASIONALLY)


Tobacco Use:  No


Substance Use:  No





Allergies-Medications


(Allergen,Severity, Reaction):  


Coded Allergies:  


     Penicillins (Verified  Allergy, Severe, vomiting, 3/15/18)


     Sulfa (Sulfonamide Antibiotics) (Verified  Allergy, Severe, vomiting, 3/15/

18)


     ciprofloxacin (Verified  Allergy, Severe, vomiting, 3/15/18)


Reported Meds & Prescriptions





Reported Meds & Active Scripts


Active


Oxygen (O2)  Device Liter KONSTANTIN.CANULA CONTINUOUS


     Oxygen Concentrator Portable Gaseous


     2 L/min via Nasal Canula Continuous


     For 99 months


Reported


Warfarin 1 Mg Tab 1.25 Mg PO TUESDAY


Warfarin 2.5 Mg Tab 2.5 Mg PO DAILY


Valsartan 320 Mg Tab 320 Mg PO DAILY


Premarin (Estrogens Conjugated) 0.3 Mg Tab 0.3 Mg PO DAILY


Omeprazole 20 Mg Tab 20 Mg PO DAILY


Metoprolol Succinate/HCTZ 100-12.5  Mg-12.5 Mg Tab 1 Tab PO DAILY


Cartia Xt (Diltiazem ER 24 HR) 120 Mg Caper 120 Mg PO DAILY








Review of Systems


Except as stated in HPI:  all other systems reviewed are Neg


General / Constitutional:  No: Fever, Chills


HENT:  No: Headaches, Lightheadedness


Cardiovascular:  No: Chest Pain or Discomfort


Respiratory:  Positive: Shortness of Breath


Gastrointestinal:  No: Nausea, Vomiting


Neurologic:  Positive: Weakness





Physical Exam


Narrative


GENERAL: Awake and alert, in no acute distress.


SKIN: Jaundice of the face.


HEAD: Atraumatic. Normocephalic. 


EYES: Pupils equal and round.  Scleral icterus present. No injection or 

drainage. 


ENT: Mucous membranes pink and moist.


NECK: Trachea midline. No JVD. 


CARDIOVASCULAR: Regular rate and rhythm.  No murmur appreciated.


RESPIRATORY: No accessory muscle use. Clear to auscultation. Breath sounds 

equal bilaterally. 


GASTROINTESTINAL: Abdomen soft, non-tender, nondistended. 


MUSCULOSKELETAL: No obvious deformities. No clubbing.  No cyanosis.  No edema. 


NEUROLOGICAL: Awake and alert. No obvious cranial nerve deficits.  Motor 

grossly within normal limits. Normal speech.


PSYCHIATRIC: Appropriate mood and affect; insight and judgment normal.





Data


Data


Last Documented VS





Vital Signs








  Date Time  Temp Pulse Resp B/P (MAP) Pulse Ox O2 Delivery O2 Flow Rate FiO2


 


4/9/18 03:35     96 Nasal Cannula 2.00 


 


4/9/18 03:31 97.9 85 16 153/72 (99)    








Orders





 Orders


Complete Blood Count With Diff (4/9/18 03:41)


Basic Metabolic Panel (Bmp) (4/9/18 03:41)


B-Type Natriuretic Peptide (4/9/18 03:41)


Act Partial Throm Time (Ptt) (4/9/18 03:41)


Prothrombin Time / Inr (Pt) (4/9/18 03:41)


Troponin I (4/9/18 03:41)


Urinalysis - C+S If Indicated (4/9/18 03:41)


Iv Access Insert/Monitor (4/9/18 03:41)


Ecg Monitoring (4/9/18 03:41)


Oximetry (4/9/18 03:41)


Oxygen Administration (4/9/18 03:41)


Chest, Single Ap (4/9/18 03:41)


Sodium Chloride 0.9% Flush (Ns Flush) (4/9/18 03:45)


Hepatic Functional Panel (4/9/18 03:41)


Lipase (4/9/18 03:41)


Ondansetron Inj (Zofran Inj) (4/9/18 03:45)


Cefepime Inj (Maxipime Inj) (4/9/18 04:45)


Azithromycin Inj (Zithromax Inj) (4/9/18 04:45)


Red Blood Cells (Rbc) (4/9/18 04:35)


Blood Product Administration (4/9/18 04:35)


Sodium Chlor 0.9% 250 Ml Inj (Ns 250 Ml (4/9/18 04:45)


Type And Screen (4/9/18 04:35)


Admit Order (Ed Use Only) (4/9/18 )





Labs





Laboratory Tests








Test


  4/9/18


03:45


 


White Blood Count 22.6 TH/MM3 


 


Red Blood Count 1.91 MIL/MM3 


 


Hemoglobin 6.2 GM/DL 


 


Hematocrit 18.9 % 


 


Mean Corpuscular Volume 98.5 FL 


 


Mean Corpuscular Hemoglobin 32.4 PG 


 


Mean Corpuscular Hemoglobin


Concent 32.9 % 


 


 


Red Cell Distribution Width 20.1 % 


 


Platelet Count 149 TH/MM3 


 


Mean Platelet Volume 10.9 FL 


 


Neutrophils (%) (Auto) 63.8 % 


 


Lymphocytes (%) (Auto) 10.1 % 


 


Monocytes (%) (Auto) 22.6 % 


 


Eosinophils (%) (Auto) 1.5 % 


 


Basophils (%) (Auto) 2.0 % 


 


Neutrophils # (Auto) 14.4 TH/MM3 


 


Lymphocytes # (Auto) 2.3 TH/MM3 


 


Monocytes # (Auto) 5.1 TH/MM3 


 


Eosinophils # (Auto) 0.3 TH/MM3 


 


Basophils # (Auto) 0.5 TH/MM3 


 


CBC Comment AUTO DIFF 


 


Differential Total Cells


Counted 100 


 


 


Neutrophils % (Manual) 53 % 


 


Band Neutrophils % 14 % 


 


Lymphocytes % 13 % 


 


Monocytes % 16 % 


 


Eosinophils % 2 % 


 


Neutrophils # (Manual) 15.6 TH/MM3 


 


Metamyelocytes 1 % 


 


Myelocytes 1 % 


 


Differential Comment


  FINAL DIFF


MANUAL


 


Platelet Estimate NORMAL 


 


Platelet Morphology Comment ENLARGED 


 


Keratocytes OCC 


 


Prothrombin Time 32.2 SEC 


 


Prothromb Time International


Ratio 3.2 RATIO 


 


 


Activated Partial


Thromboplast Time 34.5 SEC 


 


 


Blood Urea Nitrogen 18 MG/DL 


 


Creatinine 0.80 MG/DL 


 


Random Glucose 139 MG/DL 


 


Total Protein 7.4 GM/DL 


 


Albumin 2.8 GM/DL 


 


Calcium Level 7.9 MG/DL 


 


Alkaline Phosphatase 295 U/L 


 


Aspartate Amino Transf


(AST/SGOT) 184 U/L 


 


 


Alanine Aminotransferase


(ALT/SGPT) 116 U/L 


 


 


Total Bilirubin 5.8 MG/DL 


 


Direct Bilirubin 3.9 MG/DL 


 


Sodium Level 138 MEQ/L 


 


Potassium Level 3.7 MEQ/L 


 


Chloride Level 103 MEQ/L 


 


Carbon Dioxide Level 19.8 MEQ/L 


 


Anion Gap 15 MEQ/L 


 


Estimat Glomerular Filtration


Rate 69 ML/MIN 


 


 


Indirect Bilirubin 1.9 MG/DL 


 


Troponin I


  LESS THAN 0.02


NG/ML


 


B-Type Natriuretic Peptide 907 PG/ML 


 


Lipase 186 U/L 











MDM


Medical Decision Making


Medical Screen Exam Complete:  Yes


Emergency Medical Condition:  Yes


Medical Record Reviewed:  Yes


Interpretation(s)


ECG shows atrial fibrillation at a rate of 94


Differential Diagnosis


Anemia versus liver failure versus CHF versus ACS


Narrative Course


Patient is an 83-year-old female who comes in complaining of shortness of 

breath.  She is jaundice on arrival, no other acute abnormality seen on exam.  

IV established, labs sent.  Labs show worsening elevation in her bilirubin 

level.  She has a white blood cell count of 22.  Hemoglobin is 6.2.





Chest x-ray shows bilateral lower lobe opacities.


Last 24 hours Impressions








Chest X-Ray 4/9/18 0341 Signed





Impressions: 





 Service Date/Time:  Monday, April 9, 2018 03:58 - CONCLUSION: Bibasilar 





 opacities.     Ziyad Sexton MD 





Patient did have an ultrasound of her liver during her previous admission.  All 

that was found was nonobstructing gallstones, no evidence of cholecystitis.





Patient given 2 units of PRBCs.  Given antibiotics.  She will be admitted for 

further management.





Diagnosis





 Primary Impression:  


 Transaminitis


 Additional Impressions:  


 Leukocytosis


 Qualified Codes:  D72.829 - Elevated white blood cell count, unspecified


 Anemia


 Qualified Codes:  D50.9 - Iron deficiency anemia, unspecified


 Hyperbilirubinemia





Admitting Information


Admitting Physician Requests:  Admit











Annita Weston MD Apr 9, 2018 04:57

## 2018-04-09 NOTE — HHI.HP
__________________________________________________





[facility]


Service


[facility]


Primary Care Physician


Yuliet Wan MD


Admission Diagnosis





SOB, anemia, jaundice


Diagnoses:  


(1) Anemia


Diagnosis:  Principal





Chief Complaint:  


shortness of breath


Travel History


International Travel<30 Days:  No


Contact w/Intl Traveler <30 Da:  No


Traveled to Known Affected Are:  No


History of Present Illness


patient is a 84 y/o female with history of hemochromatosis,atrial fibrillation 

and hypertension who presented to ER with worsening anemia. she was admitted to 

this hospital about a month ago for the same reason. she was transfused with 

PRBC, evaluated by hematology and then discharged home. she says that initially 

she was feeling fine but gradually her sob got worse which made her to come 

back to ER. she denies any dizziness, chest pain, rectal bleed, black stools, 

cough or sputum production. she denies any abdominal pain, nausea or vomiting.





Review of Systems


Constitutional:  DENIES: Fever, Weight loss, Chills, Night Sweats


Eyes:  DENIES: Blurred vision, Diplopia, Vision loss, Double Vision


Ears, nose, mouth, throat:  DENIES: Tinnitus, Vertigo, Throat pain, Epistaxis


Respiratory:  COMPLAINS OF: Shortness of breath, DENIES: Apneas, Cough, Snoring

, Wheezing, Hemoptysis, Sputum production


Cardiovascular:  DENIES: Chest pain, Palpitations, Syncope, Dyspnea on Exertion

, PND, Lower Extremity Edema, Orthopnea, Claudication


Gastrointestinal:  DENIES: Abdominal pain, Black stools, Bloody stools, 

Constipation, Diarrhea, Nausea, Vomiting, Difficulty Swallowing, Anorexia


Genitourinary:  DENIES: Urinary frequency, Urgency, Hematuria, Dysuria


Musculoskeletal:  DENIES: Joint pain, Muscle aches, Stiffness, Joint Swelling


Integumentary:  DENIES: Rash


Neurologic:  DENIES: Abnormal gait, Headache, Localized weakness, Paresthesias, 

Seizures, Speech Problems, Tremor, Poor Balance


Psychiatric:  DENIES: Anxiety, Confusion, Mood changes, Depression, 

Hallucinations, Agitation, Suicidal Ideation, Homicidal Ideation, Delusions





Past Family Social History


Past Medical History


hemochromatosis/atrial fibrillation/ hypertension.


Past Surgical History


hysterectomy/hip replacement.


Reported Medications


Warfarin 1 Mg Tab 1.25 Mg PO TUESDAY


Warfarin 2.5 Mg Tab 2.5 Mg PO DAILY


Valsartan 320 Mg Tab 320 Mg PO DAILY


Premarin (Estrogens Conjugated) 0.3 Mg Tab 0.3 Mg PO DAILY


Omeprazole 20 Mg Tab 20 Mg PO DAILY


Metoprolol Succinate/HCTZ 100-12.5  Mg-12.5 Mg Tab 1 Tab PO DAILY


Cartia Xt (Diltiazem ER 24 HR) 120 Mg Caper 120 Mg PO DAILY


Allergies:  


Coded Allergies:  


     Penicillins (Verified  Allergy, Severe, vomiting, 3/15/18)


     Sulfa (Sulfonamide Antibiotics) (Verified  Allergy, Severe, vomiting, 3/15/

18)


     ciprofloxacin (Verified  Allergy, Severe, vomiting, 3/15/18)


Active Ordered Medications





Inpatient Medications


Albuterol/ Ipratropium (Duoneb Neb) 1 ampule Q4HR NEB  PRN NEB SOB/WHEEZING;  

Start 18 at 05:00


Azithromycin 500 mg/Sodium Chloride 250 ml @  250 mls/hr Q24H IV ;  Start 4/10/

18 at 06:00


Bisacodyl (Dulcolax Supp) 10 mg DAILY  PRN RECTAL SEVERE CONSITIPATION;  Start  at 05:00


Cefepime HCl 1000 mg/Sodium Chloride 100 ml @  200 mls/hr ONCE  ONCE IV  Last 

administered on 18at 04:47;  Start 18 at 04:45;  Stop 18 at 05:15;  

Status DC


Ceftriaxone Sodium 1000 mg/ Sodium Chloride 100 ml @  200 mls/hr Q24H IV ;  

Start 4/10/18 at 06:00


Diltiazem HCl (Cardizem Cd) 120 mg DAILY PO ;  Start 18 at 09:00


Lactulose (Lactulose Liq) 30 ml DAILY  PRN PO SEVERE CONSITIPATION;  Start  at 05:00


Magnesium Hydroxide (Milk Of Magnesia Liq) 30 ml Q12H  PRN PO Mild constipation

;  Start 18 at 05:00


Morphine Sulfate (Morphine Inj) 2 mg Q3H  PRN IV PUSH Pain 6-10;  Start 18 

at 05:00


Ondansetron HCl (Zofran Inj) 4 mg Q6H  PRN IVP NAUSEA OR VOMITING Last 

administered on 18at 07:24;  Start 18 at 05:00


Oxycodone HCl (Roxicodone) 5 mg Q4H  PRN PO PAIN SCALE 3 TO 5;  Start 18 at 

05:00


Senna/Docusate Sodium (Sita-Colace) 1 tab BID PO ;  Start 18 at 09:00


Sennosides (Senokot) 17.2 mg Q12H  PRN PO Moderate constipation;  Start 18 

at 05:00


Sodium Chloride (NS Flush) 2 ml BID IV FLUSH ;  Start 18 at 09:00


Family History


leukemia in brother.


Social History


doesn't smoke. drinks occasionally.





Physical Exam


Vital Signs





Vital Signs








  Date Time  Temp Pulse Resp B/P (MAP) Pulse Ox O2 Delivery O2 Flow Rate FiO2


 


18 08:35 97.1 81 20 121/72 97   


 


18 08:31 97.1 81 20 121/72 97   


 


18 07:58 97.6 70 18 134/62 99   


 


18 07:25  75 18 121/60 (80) 100 Nasal Cannula 2.00 


 


18 07:15 97.6 78 18 123/90 96   


 


18 06:53  69 12 130/65 (86) 98 Nasal Cannula 2.00 


 


18 05:27     100 Nasal Cannula 2.00 


 


18 04:53     100 Nasal Cannula 2.00 


 


18 03:35     96 Nasal Cannula 2.00 


 


18 03:31 97.9 85 16 153/72 (99) 94   








Physical Exam


GENERAL: This is a well-nourished, well-developed patient, in no apparent 

distress.


SKIN: No rashes, ecchymoses or lesions. Cool and dry.


HEAD: Atraumatic. Normocephalic. No temporal or scalp tenderness.


EYES: Pupils equal round and reactive. Extraocular motions intact. No scleral 

icterus. No injection or drainage. 


ENT: Nose without bleeding, purulent drainage or septal hematoma. Throat 

without erythema, tonsillar hypertrophy or exudate. Uvula midline. Airway 

patent.


NECK: Trachea midline. No JVD or lymphadenopathy. Supple, nontender, no 

meningeal signs.


CARDIOVASCULAR: Regular rate and rhythm without murmurs, gallops, or rubs. 


RESPIRATORY: Clear to auscultation. Breath sounds equal bilaterally. No wheezes

, rales, or rhonchi.  


GASTROINTESTINAL: Abdomen soft, non-tender, nondistended. No hepato-splenomegaly

, or palpable masses. No guarding.


MUSCULOSKELETAL: Extremities without clubbing, cyanosis, or edema. No joint 

tenderness, effusion, or edema noted. No calf tenderness. Negative Homans sign 

bilaterally.


NEUROLOGICAL: Awake and alert. Cranial nerves II through XII intact.  Motor and 

sensory grossly within normal limits. Five out of 5 muscle strength in all 

muscle groups.  Normal speech.


Laboratory





Laboratory Tests








Test


  18


03:45


 


White Blood Count 22.6 


 


Red Blood Count 1.91 


 


Hemoglobin 6.2 


 


Hematocrit 18.9 


 


Mean Corpuscular Volume 98.5 


 


Mean Corpuscular Hemoglobin 32.4 


 


Mean Corpuscular Hemoglobin


Concent 32.9 


 


 


Red Cell Distribution Width 20.1 


 


Platelet Count 149 


 


Mean Platelet Volume 10.9 


 


Neutrophils (%) (Auto) 63.8 


 


Lymphocytes (%) (Auto) 10.1 


 


Monocytes (%) (Auto) 22.6 


 


Eosinophils (%) (Auto) 1.5 


 


Basophils (%) (Auto) 2.0 


 


Neutrophils # (Auto) 14.4 


 


Lymphocytes # (Auto) 2.3 


 


Monocytes # (Auto) 5.1 


 


Eosinophils # (Auto) 0.3 


 


Basophils # (Auto) 0.5 


 


CBC Comment AUTO DIFF 


 


Differential Total Cells


Counted 100 


 


 


Neutrophils % (Manual) 53 


 


Band Neutrophils % 14 


 


Lymphocytes % 13 


 


Monocytes % 16 


 


Eosinophils % 2 


 


Neutrophils # (Manual) 15.6 


 


Metamyelocytes 1 


 


Myelocytes 1 


 


Differential Comment


  FINAL DIFF


MANUAL


 


Platelet Estimate NORMAL 


 


Platelet Morphology Comment ENLARGED 


 


Keratocytes OCC 


 


Prothrombin Time 32.2 


 


Prothromb Time International


Ratio 3.2 


 


 


Activated Partial


Thromboplast Time 34.5 


 


 


Blood Urea Nitrogen 18 


 


Creatinine 0.80 


 


Random Glucose 139 


 


Total Protein 7.4 


 


Albumin 2.8 


 


Calcium Level 7.9 


 


Alkaline Phosphatase 295 


 


Aspartate Amino Transf


(AST/SGOT) 184 


 


 


Alanine Aminotransferase


(ALT/SGPT) 116 


 


 


Total Bilirubin 5.8 


 


Direct Bilirubin 3.9 


 


Sodium Level 138 


 


Potassium Level 3.7 


 


Chloride Level 103 


 


Carbon Dioxide Level 19.8 


 


Anion Gap 15 


 


Estimat Glomerular Filtration


Rate 69 


 


 


Indirect Bilirubin 1.9 


 


Troponin I LESS THAN 0.02 


 


B-Type Natriuretic Peptide 907 


 


Lipase 186 








Result Diagram:  


18 0345





Imaging





Last Impressions








Chest X-Ray 18 [id number] Signed





Impressions: 





 Service Date/Time:  2018 03:58 - CONCLUSION: Bibasilar 





 opacities.     Eric F. Tocci, MD Caprini VTE Risk Assessment


Caprini VTE Risk Assessment:  Mod/High Risk (score >= 2)


Caprinnhan Risk Assessment Model











 Point Value = 1          Point Value = 2  Point Value = 3  Point Value = 5


 


Age 41-60


Minor surgery


BMI > 25 kg/m2


Swollen legs


Varicose veins


Pregnancy or postpartum


History of unexplained or recurrent


   spontaneous 


Oral contraceptives or hormone


   replacement


Sepsis (< 1 month)


Serious lung disease, including


   pneumonia (< 1 month)


Abnormal pulmonary function


Acute myocardial infarction


Congestive heart failure (< 1 month)


History of inflammatory bowel disease


Medical patient at bed rest Age 61-74


Arthroscopic surgery


Major open surgery (> 45 min)


Laparoscopic surgery (> 45 min)


Malignancy


Confined to bed (> 72 hours)


Immobilizing plaster cast


Central venous access Age >= 75


History of VTE


Family history of VTE


Factor V Leiden


Prothrombin [id number]


Lupus anticoagulant


Anticardiolipin antibodies


Elevated serum homocysteine


Heparin-induced thrombocytopenia


Other congenital or acquired


   thrombophilia Stroke (< 1 month)


Elective arthroplasty


Hip, pelvis, or leg fracture


Acute spinal cord injury (< 1 month)








Prophylaxis Regimen











   Total Risk


Factor Score Risk Level Prophylaxis Regimen


 


0-1      Low Early ambulation


 


2 Moderate Order ONE of the following:


*Sequential Compression Device (SCD)


*Heparin 5000 units SQ BID


 


3-4 Higher Order ONE of the following medications:


*Heparin 5000 units SQ TID


*Enoxaparin/Lovenox 40 mg SQ daily (WT < 150 kg, CrCl > 30 mL/min)


*Enoxaparin/Lovenox 30 mg SQ daily (WT < 150 kg, CrCl > 10-29 mL/min)


*Enoxaparin/Lovenox 30 mg SQ BID (WT < 150 kg, CrCl > 30 mL/min)


AND/OR


*Sequential Compression Device (SCD)


 


5 or more Highest Order ONE of the following medications:


*Heparin 5000 units SQ TID (Preferred with Epidurals)


*Enoxaparin/Lovenox 40 mg SQ daily (WT < 150 kg, CrCl > 30 mL/min)


*Enoxaparin/Lovenox 30 mg SQ daily (WT < 150 kg, CrCl > 10-29 mL/min)


*Enoxaparin/Lovenox 30 mg SQ BID (WT < 150 kg, CrCl > 30 mL/min)


AND


*Sequential Compression Device (SCD)











Assessment and Plan


Assessment and Plan


A/P  





- acute on chronic anemia with history of hemochromatosis


  PRBC transfusion in process- will continue to monitor H/H- hematology and GI 

consulted.


-questionable pneumonia with leukocytosis and bibasilar opacities on CXR-  

started on antibiotics-


-atrial fibrillation - on coumadin with supratherpeutic INR; HR controlled- 

continue Cardizem- hold Metoprolol for now-coumadin on hold.


-hypertension; resumed Cardizem- hold other home meds- will monitor and adjust 

the regimen as needed.


-elevated LFT's- recent hepatitis panel negative and liver US with gallstones 

with no biliary duct dilatation. GI consulted as noted above.


-DVT prophylaxis; resume Coumadin- pending GI and Hematology evaluation.


Discussed Condition With


the patient.





Physician Certification


2 Midnight Certification Type:  Admission for Inpatient Services


Order for Inpatient Services


The services are ordered in accordance with Medicare regulations or non-

Medicare payer requirements, as applicable.  In the case of services not 

specified as inpatient-only, they are appropriately provided as inpatient 

services in accordance with the 2-midnight benchmark.


Estimated LOS (days):  2


 days is the estimated time the patient will need to remain in the hospital, 

assuming treatment plan goals are met and no additional complications.


Post-Hospital Plan:  Home











July Murphy MD 2018 08:58

## 2018-04-09 NOTE — MB
cc:

Teto Wasserman MD

****

 

 

DATE:

04/09/2018

 

REASON FOR CONSULTATION:

Consult requested by hospitalist for evaluation of severe anemia and 

leukocytosis.

 

HISTORY OF PRESENT ILLNESS:

Barb is a pleasant 83-year-old female.  She was admitted to this 

hospital about 3 weeks ago.  I was asked to see her during that 

admission for the same complaint, about leukocytosis and anemia.  I 

had ordered the workup for that.  



She was in [place] last year and she saw a hematologist.  

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 

hemochromatosis.  The patient was getting phlebotomies.  The last 

phlebotomy was in December.  During that time, her white count was 

elevated.  Her hematologist has ordered a workup and has recommended a

bone marrow biopsy.  However, patient was coming down to [place] and 

deferred the workup.

 

She was admitted here last month for severe anemia and had received blood 

transfusion, and she was discharged to home.  I saw her last 

week, Friday, in our office with her significant other.  We had 

discussed obtaining the records from [place] regarding the workup for

the leukocytosis, anemia and hemochromatosis.  We had discussed that 

if the workup is inconclusive, then we will do a bone marrow aspirate 

and biopsy to evaluate for possible leukemia.

 

The patient over the weekend was not feeling well.  She had the same 

problems with extreme weakness, tiredness and fatigue.  She was unable to

walk.  She was getting dizzy and had some blurring of vision.  She 

decided to come to the emergency room yesterday.  The admission CBC 

showed a white count of 22.6, hemoglobin 6.2, MCV and MCH both are 

normal.  Platelet count is 149.  The differential count is significant

for neutrophilia with absolute neutrophil count of 15.6.  She has 

metamyelocytes and myelocytes. She is now admitted to the hospital and

has received 2 units of blood transfusion.  Her liver enzymes are 

elevated.  GI has been consulted.  The patient is getting 

MRCP now.  The rest of the review of systems is negative.

 

PAST MEDICAL HISTORY:

Atrial fibrillation, hemochromatosis, hypertension, anemia and 

leukocytosis.

 

PAST SURGICAL HISTORY:

Hip replacement, total hysterectomy, left lower leg vein stripping.

 

ALLERGIES:

PENICILLIN, SULFA, CIPRO.

 

MEDICATIONS:

Prior to coming to the hospital:  Valsartan, Premarin, omeprazole, 

metoprolol, Cartia and warfarin.

 

FAMILY HISTORY:

None for malignancy.

 

SOCIAL HISTORY:

She does not smoke cigarettes and does not drink alcohol.

 

PHYSICAL EXAMINATION:

GENERAL:  Reveals a well-developed, well-nourished, ill-appearing 

white female in no apparent distress.

VITAL SIGNS:  Temperature 97.8, heart rate is 92, blood pressure 

175/95, O2 saturation 98%.

HEENT:  Sclerae are deeply icteric.  No oral lesions noted.

NECK:  No lymphadenopathy.

LUNGS:  Clear.  No wheezing, rhonchi or rales.

HEART:  Regular rate and rhythm.

ABDOMEN:  Soft.  Tenderness noted in the right upper quadrant.

EXTREMITIES:  No pedal edema.

NEUROLOGIC:  Awake, alert, oriented x 3.

SKIN:  No significant lesions noted.

 

ASSESSMENT:

1.  Leukocytosis with severe anemia, most likely due to leukemia until

proven otherwise.

2.  Elevated liver enzymes.  The etiology of that is unknown.

3.  History of hemochromatosis and has been treated with phlebotomy in

the past.  The last phlebotomy was in December of last year.

 

PLAN:

I have reviewed her available records, and I have discussed with the 

patient regarding the leukocytosis and severe anemia.  The MCV and MCH

both are normal.  



On 03/15, when she was admitted to this 

hospital, the CBC showed  hemoglobin of 7.9 and she had received 2 

units of blood transfusion.  On that CBC, her MCV was high at 100.2, 

white count was 19.1 and platelet count was 201.  The differential 

count was significant for neutrophilia, metamyelocytes, myelocytes.  

Subsequently, her white count went up to 30,000.  I had ordered the 

JAK2 mutation and BCR-ABL.  Both came back negative.  Also, the 

hemochromatosis gene analysis came back positive for 2 copies of C282Y

mutation.  



At this time, my recommendation is to repeat iron studies, 

serum haptoglobin, reticulocyte count, LDH, serum protein 

electrophoresis, KATHLEEN, rheumatoid factor, sed rate and C-reactive 

protein.  I will also consult interventional radiology for CT-guided 

core needle biopsy and aspirate to evaluate for leukemia.  The patient

does not have any bleeding.  Her stools are heme negative.  GI has 

been consulted.  Most likely cause of severe anemia and leukocytosis 

is leukemia until proven otherwise.  This was discussed with the 

patient.  She stated that when she saw hematologist in [place], he 

was also thinking of getting a bone marrow biopsy to evaluate for 

possible leukemia.  The patient's INR is elevated at 3.2 which is due to 

coumadin. coumadin is now on hold. She will 

benefit from FFP prior to the bone marrow biopsy.  Further 

recommendations based on her hospital stay.

 

Thank you for asking my opinion.

 

 

__________________________________

MD CARINA Gorman/REYNALDO

D: 04/09/2018, 07:15 PM

T: 04/09/2018, 07:44 PM

Visit #: [id number]

Job #: [id number]

DMITRY

## 2018-04-09 NOTE — RADRPT
EXAM DATE/TIME:  04/09/2018 18:50 

 

[facility] COMPARISON:     

No previous studies available for comparison.

       

 

 

INDICATIONS :     

Obstruction.

                     

 

MEDICAL HISTORY :     

Hypertension.     

 

SURGICAL HISTORY :           

Bilateral hip replacement.

 

ENCOUNTER:     

Initial

 

ACUITY:     

3 day

 

PAIN SCORE:     

6/10

 

LOCATION:     

Bilateral upper quadrant 

 

TECHNIQUE:     

Multiplanar, multisequence magnetic resonance imaging of the abdomen was performed.  High-resolution 
3D dataset was utilized to reconstruct maximum-intensity projection (MIP) images.

 

FINDINGS:     

Small to moderate bilateral complex pleural effusions.

 

There is mild periportal edema in the liver. Multiple gallstones present within the gallbladder. The 
gallbladder wall is thickened and there is pericholecystic fluid. There is a small amount of ascites 
as well. Mild to moderate anasarca.

 

No significant biliary ductal pancreatic ductal dilatation. No acute findings in the spleen, adrenals
, kidneys or pancreas.

 

CONCLUSION:     

Small to moderate bilateral complex pleural effusions.

 

Mild periportal edema in the liver with minimal ascites and anasarca.

 

No biliary ductal or pancreatic ductal dilatation. Numerous gallstones. 

 

 

 Rogerio Parks MD on April 09, 2018 at 19:35           

Board Certified Radiologist.

 This report was verified electronically.

## 2018-04-09 NOTE — PD.CONS
HPI


History of Present Illness


This is a 83 year old female with hx hemochromatosis who presented with anemia 

and SOB.  She has been feeling fatigued and SOB for the last 3 weeks. GI is 

consulted for elevated LFTs.  SHe does endorse some nausea she associates with 

SOB but no vomiting.  No obvious bleeding, she denies blood in stool or black 

tarry stool.  Denies abd pain.  Denies prior liver problems. She sees Dr Wasserman and does have phlebotomy for hemochromatosis.  She has 2 x copies 

C282Y.  US 3/16/18 showed gallstones, no dilatation.  CT 3/15/18 showed 

suspected venous congestion liver.  At that time she had a negative hepatitis 

panel. No significat etoh consumption.  She had a gastroenterologist in [place] and 

her last colonsocopy was "long time ago" and she cannot recall further details. 

She had an EGD 2-3 y ago that was normal.  


 (Yareli Johnson)





PFSH


Past Medical History


hemochromatosis/atrial fibrillation/ hypertension.


Past Surgical History


hysterectomy/hip replacement.


 (Yareli Johnson)


Coded Allergies:  


     Penicillins (Verified  Allergy, Severe, vomiting, 3/15/18)


     Sulfa (Sulfonamide Antibiotics) (Verified  Allergy, Severe, vomiting, 3/15/

18)


     ciprofloxacin (Verified  Allergy, Severe, vomiting, 3/15/18)


Family History


leukemia in brother.


Social History


doesn't smoke. drinks occasionally.


 (Yareli Johnson)





Review of Systems


Constitutional:  COMPLAINS OF: Fatigue, DENIES: Fever


Endocrine:  DENIES: Polydipsia


Eyes:  DENIES: Blurred vision


Ears, nose, mouth, throat:  DENIES: Hearing loss


Respiratory:  DENIES: Cough


Cardiovascular:  DENIES: Chest pain


Gastrointestinal:  COMPLAINS OF: Nausea, DENIES: Abdominal pain, Black stools, 

Bloody stools, Vomiting, Hematemesis


Genitourinary:  DENIES: Hematuria


Musculoskeletal:  DENIES: Joint Swelling


Integumentary:  DENIES: Rash


Hematologic/lymphatic:  DENIES: Bruising


Immunologic/allergic:  DENIES: Eczema


Neurologic:  DENIES: Abnormal gait


Psychiatric:  DENIES: Confusion (Yareli Johnson)





GI Exam


Vitals I&O





Vital Signs








  Date Time  Temp Pulse Resp B/P (MAP) Pulse Ox O2 Delivery O2 Flow Rate FiO2


 


4/9/18 08:35 97.1 81 20 121/72 97   


 


4/9/18 08:31 97.1 81 20 121/72 97   


 


4/9/18 07:58 97.6 70 18 134/62 99   


 


4/9/18 07:25  75 18 121/60 (80) 100 Nasal Cannula 2.00 


 


4/9/18 07:15 97.6 78 18 123/90 96   


 


4/9/18 06:53  69 12 130/65 (86) 98 Nasal Cannula 2.00 


 


4/9/18 05:27     100 Nasal Cannula 2.00 


 


4/9/18 04:53     100 Nasal Cannula 2.00 


 


4/9/18 03:35     96 Nasal Cannula 2.00 


 


4/9/18 03:31 97.9 85 16 153/72 (99) 94   














I/O      


 


 4/8/18 4/8/18 4/8/18 4/9/18 4/9/18 4/9/18





 07:00 15:00 23:00 07:00 15:00 23:00


 


Intake Total    350 ml 20 ml 


 


Balance    350 ml 20 ml 


 


      


 


Intake IV Total    350 ml  


 


Blood Product IV Normal Saline Flush     20 ml 








Imaging





Last Impressions








Chest X-Ray 4/9/18 0341 Signed





Impressions: 





 Service Date/Time:  Monday, April 9, 2018 03:58 - CONCLUSION: Bibasilar 





 opacities.     Ziyad Sexton MD 








Laboratory











Test


  4/9/18


03:45


 


White Blood Count 22.6 TH/MM3 


 


Red Blood Count 1.91 MIL/MM3 


 


Hemoglobin 6.2 GM/DL 


 


Hematocrit 18.9 % 


 


Mean Corpuscular Volume 98.5 FL 


 


Mean Corpuscular Hemoglobin 32.4 PG 


 


Mean Corpuscular Hemoglobin


Concent 32.9 % 


 


 


Red Cell Distribution Width 20.1 % 


 


Platelet Count 149 TH/MM3 


 


Mean Platelet Volume 10.9 FL 


 


Neutrophils (%) (Auto) 63.8 % 


 


Lymphocytes (%) (Auto) 10.1 % 


 


Monocytes (%) (Auto) 22.6 % 


 


Eosinophils (%) (Auto) 1.5 % 


 


Basophils (%) (Auto) 2.0 % 


 


Neutrophils # (Auto) 14.4 TH/MM3 


 


Lymphocytes # (Auto) 2.3 TH/MM3 


 


Monocytes # (Auto) 5.1 TH/MM3 


 


Eosinophils # (Auto) 0.3 TH/MM3 


 


Basophils # (Auto) 0.5 TH/MM3 


 


CBC Comment AUTO DIFF 


 


Differential Total Cells


Counted 100 


 


 


Neutrophils % (Manual) 53 % 


 


Band Neutrophils % 14 % 


 


Lymphocytes % 13 % 


 


Monocytes % 16 % 


 


Eosinophils % 2 % 


 


Neutrophils # (Manual) 15.6 TH/MM3 


 


Metamyelocytes 1 % 


 


Myelocytes 1 % 


 


Differential Comment


  FINAL DIFF


MANUAL


 


Platelet Estimate NORMAL 


 


Platelet Morphology Comment ENLARGED 


 


Keratocytes OCC 


 


Prothrombin Time 32.2 SEC 


 


Prothromb Time International


Ratio 3.2 RATIO 


 


 


Activated Partial


Thromboplast Time 34.5 SEC 


 


 


Blood Urea Nitrogen 18 MG/DL 


 


Creatinine 0.80 MG/DL 


 


Random Glucose 139 MG/DL 


 


Total Protein 7.4 GM/DL 


 


Albumin 2.8 GM/DL 


 


Calcium Level 7.9 MG/DL 


 


Alkaline Phosphatase 295 U/L 


 


Aspartate Amino Transf


(AST/SGOT) 184 U/L 


 


 


Alanine Aminotransferase


(ALT/SGPT) 116 U/L 


 


 


Total Bilirubin 5.8 MG/DL 


 


Direct Bilirubin 3.9 MG/DL 


 


Sodium Level 138 MEQ/L 


 


Potassium Level 3.7 MEQ/L 


 


Chloride Level 103 MEQ/L 


 


Carbon Dioxide Level 19.8 MEQ/L 


 


Anion Gap 15 MEQ/L 


 


Estimat Glomerular Filtration


Rate 69 ML/MIN 


 


 


Indirect Bilirubin 1.9 MG/DL 


 


Troponin I


  LESS THAN 0.02


NG/ML


 


B-Type Natriuretic Peptide 907 PG/ML 


 


Lipase 186 U/L 








Physical Examination


HEENT: PERRL; normocephalic; atraumatic; subtle icterus.  


CHEST:  CTA


CARDIAC:  RRR


ABDOMEN:  Soft, nondistended, nontender; no hepatosplenomegaly; bowel sounds 

are present in all four quadrants.


EXTREMITIES: No clubbing, cyanosis, or edema.


SKIN:  Normal; no rash; no jaundice.


CNS:  No focal deficits; alert and oriented times three.


 (Yareli Johnson [facility])





Assessment and Plan


Plan


ASSESSMENT


- elevated LFTs - could be r/t hemochromatosis but will get MRCP and liver w/u 

to r/o other cause elevated liver chem,


   obstruction.  denies heavy etoh.  US 3/16/18 showed gallstones, no duct 

dilatation, CT 3/16 showed suspected venous


   congestion liver.  hep panel at that time was negative. 


- anemia hgb 6.2 on admission - no obvious bleeding.  sees hematology for 

hemochromatosis, gets phlebotomy.


   on coumadin for AF.  last colonoscopy years ago in [place], normal.  EGD 2-3 y 

ago normal.  


- thrombocytopenia - 





PLAN


- MRCP


- liver w/u


- await iron studies


- monitor HH


- transfuse as needed


- notify GI of active bleeding


- further recs to follow


pt seen by myself and Dr Ayala and this note is on his behalf


 (Yareli Johnson)


Plan


None examined, agree with above-noted, patient has iron overload most likely 

causing her elevated liver function test, patient also has anemia questionable 

etiology we will monitor for GI bleed and transfuse as needed if anemia 

persists patient may need repeat colon EGD and possible hematology consult


 (Antony Ayala MD)











Yareli Johnson Apr 9, 2018 10:29


Antony Ayala MD Apr 9, 2018 18:42

## 2018-04-10 VITALS
OXYGEN SATURATION: 93 % | TEMPERATURE: 98.1 F | RESPIRATION RATE: 18 BRPM | SYSTOLIC BLOOD PRESSURE: 163 MMHG | DIASTOLIC BLOOD PRESSURE: 85 MMHG | HEART RATE: 120 BPM

## 2018-04-10 VITALS
SYSTOLIC BLOOD PRESSURE: 176 MMHG | HEART RATE: 105 BPM | TEMPERATURE: 98.1 F | RESPIRATION RATE: 18 BRPM | DIASTOLIC BLOOD PRESSURE: 86 MMHG | OXYGEN SATURATION: 92 %

## 2018-04-10 VITALS
OXYGEN SATURATION: 91 % | TEMPERATURE: 97.5 F | HEART RATE: 89 BPM | SYSTOLIC BLOOD PRESSURE: 142 MMHG | RESPIRATION RATE: 19 BRPM | DIASTOLIC BLOOD PRESSURE: 70 MMHG

## 2018-04-10 VITALS
OXYGEN SATURATION: 95 % | DIASTOLIC BLOOD PRESSURE: 89 MMHG | SYSTOLIC BLOOD PRESSURE: 159 MMHG | HEART RATE: 106 BPM | RESPIRATION RATE: 18 BRPM | TEMPERATURE: 97.9 F

## 2018-04-10 VITALS
RESPIRATION RATE: 18 BRPM | OXYGEN SATURATION: 96 % | TEMPERATURE: 98.5 F | HEART RATE: 102 BPM | DIASTOLIC BLOOD PRESSURE: 80 MMHG | SYSTOLIC BLOOD PRESSURE: 156 MMHG

## 2018-04-10 VITALS
RESPIRATION RATE: 17 BRPM | DIASTOLIC BLOOD PRESSURE: 93 MMHG | OXYGEN SATURATION: 96 % | SYSTOLIC BLOOD PRESSURE: 174 MMHG | TEMPERATURE: 98.1 F | HEART RATE: 103 BPM

## 2018-04-10 LAB
ALBUMIN SERPL-MCNC: 2.8 GM/DL (ref 3.4–5)
ALP SERPL-CCNC: 265 U/L (ref 45–117)
ALT SERPL-CCNC: 239 U/L (ref 10–53)
AST SERPL-CCNC: 400 U/L (ref 15–37)
BASOPHILS NFR BLD AUTO: 1 % (ref 0–2)
BILIRUB SERPL-MCNC: 6.2 MG/DL (ref 0.2–1)
BUN SERPL-MCNC: 21 MG/DL (ref 7–18)
CALCIUM SERPL-MCNC: 7.9 MG/DL (ref 8.5–10.1)
CHLORIDE SERPL-SCNC: 106 MEQ/L (ref 98–107)
CREAT SERPL-MCNC: 0.64 MG/DL (ref 0.5–1)
ERYTHROCYTE [DISTWIDTH] IN BLOOD BY AUTOMATED COUNT: 18.4 % (ref 11.6–17.2)
FERRITIN SERPL-MCNC: 1277 NG/ML (ref 8–252)
GFR SERPLBLD BASED ON 1.73 SQ M-ARVRAT: 89 ML/MIN (ref 89–?)
GLUCOSE SERPL-MCNC: 104 MG/DL (ref 74–106)
HCO3 BLD-SCNC: 22.8 MEQ/L (ref 21–32)
HCT VFR BLD CALC: 24.9 % (ref 35–46)
HGB BLD-MCNC: 8.5 GM/DL (ref 11.6–15.3)
INR PPP: 3.1 RATIO
IRON (FE): 248 MCG/DL (ref 50–170)
LYMPHOCYTES: 1 % (ref 9–44)
MCH RBC QN AUTO: 31 PG (ref 27–34)
MCHC RBC AUTO-ENTMCNC: 34.2 % (ref 32–36)
MCV RBC AUTO: 90.9 FL (ref 80–100)
METAMYELOCYTES NFR BLD: 3 % (ref 0–1)
MONOCYTES: 10 % (ref 0–8)
MYELOCYTES NFR BLD: 1 % (ref 0–0)
NEUTS BAND # BLD MANUAL: 26.4 TH/MM3 (ref 1.8–7.7)
NEUTS BAND NFR BLD: 13 % (ref 0–6)
NEUTS SEG NFR BLD MANUAL: 69 % (ref 16–70)
OVALOCYTES BLD QL SMEAR: (no result)
PLATELET # BLD: 133 TH/MM3 (ref 150–450)
PMV BLD AUTO: 11.1 FL (ref 7–11)
PROMYELOCYTES NFR BLD: 1 % (ref 0–0)
PROT SERPL-MCNC: 7.3 GM/DL (ref 6.4–8.2)
PROTHROMBIN TIME: 31.6 SEC (ref 9.8–11.6)
RBC # BLD AUTO: 2.74 MIL/MM3 (ref 4–5.3)
SODIUM SERPL-SCNC: 137 MEQ/L (ref 136–145)
TOXIC GRANULES BLD QL SMEAR: (no result)
WBC # BLD AUTO: 30.4 TH/MM3 (ref 4–11)

## 2018-04-10 RX ADMIN — METOPROLOL TARTRATE SCH MG: 50 TABLET, FILM COATED ORAL at 17:20

## 2018-04-10 RX ADMIN — METOPROLOL TARTRATE SCH MG: 50 TABLET, FILM COATED ORAL at 20:40

## 2018-04-10 RX ADMIN — Medication SCH ML: at 20:40

## 2018-04-10 RX ADMIN — Medication SCH ML: at 09:00

## 2018-04-10 RX ADMIN — STANDARDIZED SENNA CONCENTRATE AND DOCUSATE SODIUM SCH TAB: 8.6; 5 TABLET, FILM COATED ORAL at 09:00

## 2018-04-10 RX ADMIN — DILTIAZEM HYDROCHLORIDE SCH MG: 120 CAPSULE, EXTENDED RELEASE ORAL at 09:18

## 2018-04-10 RX ADMIN — AZITHROMYCIN SCH MLS/HR: 500 INJECTION, POWDER, LYOPHILIZED, FOR SOLUTION INTRAVENOUS at 06:14

## 2018-04-10 RX ADMIN — SODIUM CHLORIDE SCH MLS/HR: 900 INJECTION INTRAVENOUS at 05:11

## 2018-04-10 RX ADMIN — STANDARDIZED SENNA CONCENTRATE AND DOCUSATE SODIUM SCH TAB: 8.6; 5 TABLET, FILM COATED ORAL at 20:40

## 2018-04-10 NOTE — PD.ONC.PN
Subjective


Subjective


Remarks


Afebrile overnight. 


Patient resting in bed. 


she feels fatigued and hungry. 


waiting to go down for bone marrow biopsy.





Objective


Data











  Date Time  Temp Pulse Resp B/P (MAP) Pulse Ox O2 Delivery O2 Flow Rate FiO2


 


4/10/18 08:00 97.9 106 18 159/89 (112) 95   


 


4/10/18 04:45 98.1 103 17 174/93 (120) 96   


 


4/10/18 00:30 98.5 102 18 156/80 (105) 96   


 


4/9/18 20:35 98.7 115 18 166/92 (116) 94   


 


4/9/18 16:00 97.7 85 20 140/60 (86) 98   


 


4/9/18 12:20 97.8 92 20 175/95 98   


 


4/9/18 12:05 97.2 90 20 138/67 97   


 


4/9/18 12:00 97.2 90 22 138/67 (90) 97   














 4/10/18 4/10/18 4/10/18





 07:00 15:00 23:00


 


Intake Total 460 ml  


 


Balance 460 ml  








Result Diagram:  


4/10/18 0509                                                                   

             4/10/18 0509





Laboratory Results





Laboratory Tests








Test


  4/9/18


17:45 4/10/18


05:09


 


Erythrocyte Sedimentation Rate 45 mm/hr  


 


Reticulocyte Count 0.9 %  


 


Absolute Reticulocyte Count 23.7 MIL/L  


 


Iron Level 233 MCG/DL  


 


Total Iron Binding Capacity 235 MCG/DL  


 


Percent Iron Saturation 99.1 %  


 


Ferritin 3311 NG/ML  


 


Lactate Dehydrogenase 970 U/L  


 


C-Reactive Protein 2.70 MG/DL  


 


Total Protein 7.2 GM/DL  7.3 GM/DL 


 


White Blood Count  30.4 TH/MM3 


 


Red Blood Count  2.74 MIL/MM3 


 


Hemoglobin  8.5 GM/DL 


 


Hematocrit  24.9 % 


 


Mean Corpuscular Volume  90.9 FL 


 


Mean Corpuscular Hemoglobin  31.0 PG 


 


Mean Corpuscular Hemoglobin


Concent 


  34.2 % 


 


 


Red Cell Distribution Width  18.4 % 


 


Platelet Count  133 TH/MM3 


 


Mean Platelet Volume  11.1 FL 


 


CBC Comment  AUTO DIFF 


 


Differential Total Cells


Counted 


  100 


 


 


Neutrophils % (Manual)  69 % 


 


Band Neutrophils %  13 % 


 


Lymphocytes %  1 % 


 


Monocytes %  10 % 


 


Eosinophils %  1 % 


 


Basophils %  1 % 


 


Neutrophils # (Manual)  26.4 TH/MM3 


 


Metamyelocytes  3 % 


 


Myelocytes  1 % 


 


Promyelocytes  1 % 


 


Differential Comment


  


  FINAL DIFF


MANUAL


 


Toxic Granulation  2+ 


 


Platelet Estimate  LOW 


 


Platelet Morphology Comment  NORMAL 


 


Ovalocytes  1+ 


 


Prothrombin Time  31.6 SEC 


 


Prothromb Time International


Ratio 


  3.1 RATIO 


 


 


Blood Urea Nitrogen  21 MG/DL 


 


Creatinine  0.64 MG/DL 


 


Random Glucose  104 MG/DL 


 


Albumin  2.8 GM/DL 


 


Calcium Level  7.9 MG/DL 


 


Alkaline Phosphatase  265 U/L 


 


Aspartate Amino Transf


(AST/SGOT) 


  400 U/L 


 


 


Alanine Aminotransferase


(ALT/SGPT) 


  239 U/L 


 


 


Total Bilirubin  6.2 MG/DL 


 


Sodium Level  137 MEQ/L 


 


Potassium Level  3.6 MEQ/L 


 


Chloride Level  106 MEQ/L 


 


Carbon Dioxide Level  22.8 MEQ/L 


 


Anion Gap  8 MEQ/L 


 


Estimat Glomerular Filtration


Rate 


  89 ML/MIN 


 


 


Tumor Marker Alpha Fetoprotein  2.7 NG/ML 











Administered Medications








 Medications


  (Trade)  Dose


 Ordered  Sig/Kellie


 Route


 PRN Reason  Start Time


 Stop Time Status Last Admin


Dose Admin


 


 Ceftriaxone


 Sodium 1000 mg/


 Sodium Chloride  100 ml @ 


 200 mls/hr  Q24H


 IV


   4/10/18 06:00


    4/10/18 05:11


 


 


 Azithromycin 500


 mg/Sodium Chloride  250 ml @ 


 250 mls/hr  Q24H


 IV


   4/10/18 06:00


    4/10/18 06:14


 


 


 Sodium Chloride


  (NS Flush)  2 ml  BID


 IV FLUSH


   4/9/18 09:00


    4/9/18 20:19


 


 


 Ondansetron HCl


  (Zofran Inj)  4 mg  Q6H  PRN


 IVP


 NAUSEA OR VOMITING  4/9/18 05:00


    4/9/18 07:24


 


 


 Oxycodone HCl


  (Roxicodone)  5 mg  Q4H  PRN


 PO


 PAIN SCALE 3 TO 5  4/9/18 05:00


    4/9/18 20:19


 


 


 Senna/Docusate


 Sodium


  (Sita-Colace)  1 tab  BID


 PO


   4/9/18 09:00


    4/9/18 20:19


 


 


 Diltiazem HCl


  (Cardizem Cd)  120 mg  DAILY


 PO


   4/9/18 09:00


    4/10/18 09:18


 








Objective Remarks


GENERAL: pleasant elderly female lying supine in bed in nad


SKIN: Warm and dry.


HEAD: Normocephalic.


EYES: No injection or drainage. 


NECK: Supple, trachea midline. 


CARDIOVASCULAR: IRR


RESPIRATORY: Breath sounds equal bilaterally. No accessory muscle use.


GASTROINTESTINAL: Abdomen soft, non-tender, nondistended. 


EXTREMITIES: No cyanosis


NEUROLOGICAL: awake and alert. normal speech.





Assessment/Plan


Problem List:  


(1) leukocytosis + anemia


Plan:  4/10: await bone marrow biopsy. reviewed procedure at length with 

patient and discussed waiting a few days for results.  give vitamin K 5mg PO x 

1 now.


--most likely due to leukemia until proven otherwise.


--JAK2 and BCR-ABL negative


--LDH high, haptoglobin low, rubén negative. 


--heme negative stools. 


--SPEP pending. 


--RF negative





(2) Transaminitis


ICD Codes:  R74.0 - Nonspecific elevation of levels of transaminase and lactic 

acid dehydrogenase [LDH]


Status:  Acute


Plan:  --unclear etiology





(3) Hemochromatosis


ICD Codes:  E83.119 - Hemochromatosis, unspecified


Assessment


82y/o with severe anemia and leukocytosis, suspected leukemia, admitted with 

weakness, fatigue, dizziness.


h/o Atrial fibrillation, hemochromatosis, hypertension, anemia and 


leukocytosis.


Attending Statement


The exam, history, and the medical decision-making described in the above note 

were completed with the assistance of the mid-level provider. I reviewed and 

agree with the findings presented.  I attest that I had a face-to-face 

encounter with the patient on the same day, and personally performed and 

documented my assessment and findings in the medical record.





weak , tired, SOB


Coumadin is on hold. INR is high. Give VitK 5 mg PO today. Repeat INR in [place] if 

still >2 then will give FFP prior to BM bx.


Ferritin yesterday at 1745 is 3311 most likely drawn after the Blood tx. 

Ferritin on 3/16 was 977 . The ferritin can not go up that high in short period 

of time. The 3000 ferritin is erroneous as it was done after the Blood TX.


I have called the lab and ask the jose l Haynes  to run ferritin and iron profile 

on the specimen drawn at 0345 on 4/9 (when she came to ER). She will run it 

later today.


I do not think her liver failure is due to hemochromatosis. Will d/w GI after 

the repeat Iron studies.











Mary Rousseau Apr 10, 2018 11:54


Teto Wasserman MD Apr 10, 2018 23:38

## 2018-04-10 NOTE — HHI.PR
Subjective


Remarks


The patient was resting comfortably in bed.  She was anticipating her bone 

marrow biopsy tomorrow.  She denied any pain.  She said she was going to try to 

eat some turkey tonight.  Her family was at the bedside and the questions were 

answered.  Discussed with nursing.





Objective


Vitals





Vital Signs








  Date Time  Temp Pulse Resp B/P (MAP) Pulse Ox O2 Delivery O2 Flow Rate FiO2


 


4/10/18 08:00 97.9 106 18 159/89 (112) 95   


 


4/10/18 04:45 98.1 103 17 174/93 (120) 96   


 


4/10/18 00:30 98.5 102 18 156/80 (105) 96   


 


4/9/18 20:35 98.7 115 18 166/92 (116) 94   














I/O      


 


 4/9/18 4/9/18 4/9/18 4/10/18 4/10/18 4/10/18





 07:00 15:00 23:00 07:00 15:00 23:00


 


Intake Total 350 ml 1120 ml  460 ml  


 


Balance 350 ml 1120 ml  460 ml  


 


      


 


Intake Oral  240 ml  360 ml  


 


IV Total 350 ml   100 ml  


 


Packed Cells  800 ml    


 


Blood Product IV Normal Saline Flush  80 ml    


 


# Voids  2  2  


 


# Bowel Movements    0  








Result Diagram:  


4/10/18 0509                                                                   

             4/10/18 0509





Imaging





Last Impressions








Chest X-Ray 4/9/18 0341 Signed





Impressions: 





 Service Date/Time:  Monday, April 9, 2018 03:58 - CONCLUSION: Bibasilar 





 opacities.     Ziyad Sexton MD 


 


Cholangiopancreatography MRI 4/9/18 0000 Signed





Impressions: 





 Service Date/Time:  Monday, April 9, 2018 18:50 - CONCLUSION:  Small to 

moderate 





 bilateral complex pleural effusions.  Mild periportal edema in the liver with 





 minimal ascites and anasarca.  No biliary ductal or pancreatic ductal 





 dilatation. Numerous gallstones.     Rogerio Parks MD 








Objective Remarks


GENERAL: This is a well-nourished, well-developed patient, in no apparent 

distress.


SKIN: No rashes, ecchymoses or lesions. Cool and dry. Jaundiced. 


HEAD: Atraumatic. Normocephalic. No temporal or scalp tenderness.


EYES: Pupils equal round and reactive. Extraocular motions intact. No scleral 

icterus. No injection or drainage. 


ENT: Nose without bleeding, purulent drainage or septal hematoma. Throat 

without erythema, tonsillar hypertrophy or exudate. Uvula midline. Airway 

patent.


NECK: Trachea midline. No JVD or lymphadenopathy. Supple, nontender, no 

meningeal signs.


CARDIOVASCULAR: Regular rate and rhythm without murmurs, gallops, or rubs. 


RESPIRATORY: Clear to auscultation. Breath sounds equal bilaterally. No wheezes

, rales, or rhonchi.  


GASTROINTESTINAL: Abdomen soft, non-tender, nondistended. No hepato-splenomegaly

, or palpable masses. No guarding.


MUSCULOSKELETAL: Extremities without clubbing, cyanosis, or edema. No joint 

tenderness, effusion, or edema noted. 


NEUROLOGICAL: Awake and alert. Cranial nerves II through XII intact.  Motor and 

sensory grossly within normal limits. Five out of 5 muscle strength in all 

muscle groups.  Normal speech.


PSYCH: Mood and affect appropriate.


Medications and IVs





Current Medications








 Medications


  (Trade)  Dose


 Ordered  Sig/Kellie


 Route  Start Time


 Stop Time Status Last Admin


 


 Ceftriaxone


 Sodium 1000 mg/


 Sodium Chloride  100 ml @ 


 200 mls/hr  Q24H


 IV  4/10/18 06:00


    4/10/18 05:11


 


 


 Azithromycin 500


 mg/Sodium Chloride  250 ml @ 


 250 mls/hr  Q24H


 IV  4/10/18 06:00


    4/10/18 06:14


 


 


  (Duoneb Neb)  1 ampule  Q4HR NEB  PRN


 NEB  4/9/18 05:00


     


 


 


  (NS Flush)  2 ml  UNSCH  PRN


 IV FLUSH  4/9/18 05:00


     


 


 


  (NS Flush)  2 ml  BID


 IV FLUSH  4/9/18 09:00


    4/9/18 20:19


 


 


  (Zofran Inj)  4 mg  Q6H  PRN


 IVP  4/9/18 05:00


    4/9/18 07:24


 


 


  (Morphine Inj)  2 mg  Q3H  PRN


 IV PUSH  4/9/18 05:00


     


 


 


  (Roxicodone)  5 mg  Q4H  PRN


 PO  4/9/18 05:00


    4/9/18 20:19


 


 


  (Sita-Colace)  1 tab  BID


 PO  4/9/18 09:00


    4/9/18 20:19


 


 


  (Milk Of


 Magnesia Liq)  30 ml  Q12H  PRN


 PO  4/9/18 05:00


     


 


 


  (Senokot)  17.2 mg  Q12H  PRN


 PO  4/9/18 05:00


     


 


 


  (Dulcolax Supp)  10 mg  DAILY  PRN


 RECTAL  4/9/18 05:00


     


 


 


  (Lactulose Liq)  30 ml  DAILY  PRN


 PO  4/9/18 05:00


     


 


 


  (Cardizem Cd)  120 mg  DAILY


 PO  4/9/18 09:00


    4/10/18 09:18


 


 


  (Lopressor)  50 mg  Q12HR


 PO  4/10/18 16:00


     


 











A/P


Problem List:  


(1) Anemia


ICD Code:  D64.9 - Anemia, unspecified


Status:  Acute


Assessment and Plan


Acute on chronic anemia with history of hemochromatosis and leukocytosis


S/p PRBC transfusion. Hematology consult appreciated. 


-  will continue to monitor H/H.


- bone marrow biopsy per oncology. 


- GI following.





Questionable pneumonia 


with leukocytosis and bibasilar opacities on CXR.


-  started on antibiotics.





Atrial fibrillation


 on Coumadin with supratherpeutic INR; HR controlled- continue Cardizem and 

Metoprolol.


- Coumadin on hold. Vit K per hematology.





Hypertension


- resumed Cardizem and metoprolol.





Elevated LFT's


recent hepatitis panel negative and liver US with gallstones with no biliary 

duct dilatation. GI consult appreciated. MRCP noted.


- follow LFTs.


- GI following.





DVT prophylaxis: Coumadin on hold











Krishan Mendoza DO Apr 10, 2018 16:48

## 2018-04-10 NOTE — HHI.GIFU
Subjective


Remarks


Pt resting in bed,  at bedside.  Going for BM bx.


 (Yareli Johnson)





Objective


Vitals I&O





Vital Signs








  Date Time  Temp Pulse Resp B/P (MAP) Pulse Ox O2 Delivery O2 Flow Rate FiO2


 


4/10/18 08:00 97.9 106 18 159/89 (112) 95   


 


4/10/18 04:45 98.1 103 17 174/93 (120) 96   


 


4/10/18 00:30 98.5 102 18 156/80 (105) 96   


 


4/9/18 20:35 98.7 115 18 166/92 (116) 94   


 


4/9/18 16:00 97.7 85 20 140/60 (86) 98   














I/O      


 


 4/9/18 4/9/18 4/9/18 4/10/18 4/10/18 4/10/18





 07:00 15:00 23:00 07:00 15:00 23:00


 


Intake Total 350 ml 1120 ml  460 ml  


 


Balance 350 ml 1120 ml  460 ml  


 


      


 


Intake Oral  240 ml  360 ml  


 


IV Total 350 ml   100 ml  


 


Packed Cells  800 ml    


 


Blood Product IV Normal Saline Flush  80 ml    


 


# Voids  2  2  


 


# Bowel Movements    0  








Laboratory





Laboratory Tests








Test


  4/9/18


17:45 4/10/18


05:09


 


Erythrocyte Sedimentation Rate 45  


 


Reticulocyte Count 0.9  


 


Absolute Reticulocyte Count 23.7  


 


Iron Level 233  


 


Total Iron Binding Capacity 235  


 


Percent Iron Saturation 99.1  


 


Ferritin 3311  


 


Lactate Dehydrogenase 970  


 


C-Reactive Protein 2.70  


 


Total Protein 7.2  7.3 


 


White Blood Count  30.4 


 


Red Blood Count  2.74 


 


Hemoglobin  8.5 


 


Hematocrit  24.9 


 


Mean Corpuscular Volume  90.9 


 


Mean Corpuscular Hemoglobin  31.0 


 


Mean Corpuscular Hemoglobin


Concent 


  34.2 


 


 


Red Cell Distribution Width  18.4 


 


Platelet Count  133 


 


Mean Platelet Volume  11.1 


 


CBC Comment  AUTO DIFF 


 


Differential Total Cells


Counted 


  100 


 


 


Neutrophils % (Manual)  69 


 


Band Neutrophils %  13 


 


Lymphocytes %  1 


 


Monocytes %  10 


 


Eosinophils %  1 


 


Basophils %  1 


 


Neutrophils # (Manual)  26.4 


 


Metamyelocytes  3 


 


Myelocytes  1 


 


Promyelocytes  1 


 


Differential Comment


  


  FINAL DIFF


MANUAL


 


Toxic Granulation  2+ 


 


Platelet Estimate  LOW 


 


Platelet Morphology Comment  NORMAL 


 


Ovalocytes  1+ 


 


Prothrombin Time  31.6 


 


Prothromb Time International


Ratio 


  3.1 


 


 


Blood Urea Nitrogen  21 


 


Creatinine  0.64 


 


Random Glucose  104 


 


Albumin  2.8 


 


Calcium Level  7.9 


 


Alkaline Phosphatase  265 


 


Aspartate Amino Transf


(AST/SGOT) 


  400 


 


 


Alanine Aminotransferase


(ALT/SGPT) 


  239 


 


 


Total Bilirubin  6.2 


 


Sodium Level  137 


 


Potassium Level  3.6 


 


Chloride Level  106 


 


Carbon Dioxide Level  22.8 


 


Anion Gap  8 


 


Estimat Glomerular Filtration


Rate 


  89 


 


 


Tumor Marker Alpha Fetoprotein  2.7 








Imaging





Last Impressions








Chest X-Ray 4/9/18 0341 Signed





Impressions: 





 Service Date/Time:  Monday, April 9, 2018 03:58 - CONCLUSION: Bibasilar 





 opacities.     Ziyad Sexton MD 


 


Cholangiopancreatography MRI 4/9/18 0000 Signed





Impressions: 





 Service Date/Time:  Monday, April 9, 2018 18:50 - CONCLUSION:  Small to 

moderate 





 bilateral complex pleural effusions.  Mild periportal edema in the liver with 





 minimal ascites and anasarca.  No biliary ductal or pancreatic ductal 





 dilatation. Numerous gallstones.     Rogerio Parks MD 








Physical Exam


HEENT: PERRL; normocephalic; atraumatic; no jaundice. 


CHEST:  CTA


CARDIAC:  irr HR


ABDOMEN:  Soft, nondistended, nontender; no hepatosplenomegaly; bowel sounds 

are present in all four quadrants.


EXTREMITIES: No clubbing, cyanosis, or edema.


SKIN:  Normal; no rash; no jaundice.


CNS:  No focal deficits; alert and oriented times three.


 (Yareli Johnson [facility])





Assessment and Plan


Plan


ASSESSMENT


- elevated LFTs - could be r/t hemochromatosis but will get MRCP and liver w/u 

to r/o other cause elevated liver chem,


   obstruction.  denies heavy etoh.  US 3/16/18 showed gallstones, no duct 

dilatation, CT 3/16 showed suspected venous


   congestion liver.  hep panel at that time was negative. 


- anemia hgb 6.2 on admission - no obvious bleeding.  sees hematology for 

hemochromatosis, gets phlebotomy.


   on coumadin for AF.  last colonoscopy years ago in [place], normal.  EGD 2-3 y 

ago normal.  


- thrombocytopenia - 


4/10/18  hem/onc now following, initiated w/u for leukemia, pt going for bm bx 

today.  MRCP noted, gallstones, min ascites, anasarca, mild periportal edema, 

no ductal dilatation.


  serum iron, ferritin, saturation are high.  LFT elevation likely 2/2 iron 

overload.  no obvious GI bleeding.





PLAN


- await BM bx


- await rest of liver w/u


- monitor HH


- transfuse as needed


- notify GI of active bleeding


- supportive care


pt seen by myself and Dr Ayala and this note is on his behalf





 


 (Yareli Johnson)


Plan


Patient was seen and examined, agree with the above note, going for bone marrow 

biopsy, questionable etiology for the anemia,: EGD if there is any suspicion 

for GI bleed, continue supportive care


 (Antony Ayala MD)











Yareli Johnson Apr 10, 2018 13:18


Antony Ayala MD Apr 10, 2018 19:19

## 2018-04-11 VITALS
RESPIRATION RATE: 18 BRPM | HEART RATE: 82 BPM | OXYGEN SATURATION: 96 % | DIASTOLIC BLOOD PRESSURE: 67 MMHG | SYSTOLIC BLOOD PRESSURE: 140 MMHG | TEMPERATURE: 97.8 F

## 2018-04-11 VITALS
TEMPERATURE: 97.7 F | OXYGEN SATURATION: 98 % | HEART RATE: 83 BPM | SYSTOLIC BLOOD PRESSURE: 120 MMHG | RESPIRATION RATE: 18 BRPM | DIASTOLIC BLOOD PRESSURE: 66 MMHG

## 2018-04-11 VITALS
OXYGEN SATURATION: 97 % | HEART RATE: 100 BPM | RESPIRATION RATE: 19 BRPM | TEMPERATURE: 97.8 F | DIASTOLIC BLOOD PRESSURE: 75 MMHG | SYSTOLIC BLOOD PRESSURE: 146 MMHG

## 2018-04-11 VITALS
TEMPERATURE: 97.5 F | RESPIRATION RATE: 16 BRPM | DIASTOLIC BLOOD PRESSURE: 76 MMHG | HEART RATE: 102 BPM | SYSTOLIC BLOOD PRESSURE: 150 MMHG

## 2018-04-11 VITALS
RESPIRATION RATE: 18 BRPM | DIASTOLIC BLOOD PRESSURE: 75 MMHG | HEART RATE: 81 BPM | SYSTOLIC BLOOD PRESSURE: 135 MMHG | OXYGEN SATURATION: 94 %

## 2018-04-11 VITALS
HEART RATE: 74 BPM | DIASTOLIC BLOOD PRESSURE: 80 MMHG | OXYGEN SATURATION: 92 % | SYSTOLIC BLOOD PRESSURE: 131 MMHG | RESPIRATION RATE: 20 BRPM | TEMPERATURE: 98.2 F

## 2018-04-11 VITALS
SYSTOLIC BLOOD PRESSURE: 141 MMHG | HEART RATE: 97 BPM | OXYGEN SATURATION: 93 % | RESPIRATION RATE: 18 BRPM | DIASTOLIC BLOOD PRESSURE: 72 MMHG | TEMPERATURE: 98.1 F

## 2018-04-11 VITALS
RESPIRATION RATE: 16 BRPM | DIASTOLIC BLOOD PRESSURE: 76 MMHG | HEART RATE: 99 BPM | TEMPERATURE: 97.5 F | SYSTOLIC BLOOD PRESSURE: 135 MMHG | OXYGEN SATURATION: 94 %

## 2018-04-11 VITALS
HEART RATE: 105 BPM | SYSTOLIC BLOOD PRESSURE: 144 MMHG | DIASTOLIC BLOOD PRESSURE: 72 MMHG | TEMPERATURE: 98 F | OXYGEN SATURATION: 94 % | RESPIRATION RATE: 17 BRPM

## 2018-04-11 VITALS
HEART RATE: 94 BPM | OXYGEN SATURATION: 96 % | DIASTOLIC BLOOD PRESSURE: 73 MMHG | TEMPERATURE: 98.5 F | SYSTOLIC BLOOD PRESSURE: 141 MMHG | RESPIRATION RATE: 18 BRPM

## 2018-04-11 VITALS
DIASTOLIC BLOOD PRESSURE: 69 MMHG | HEART RATE: 84 BPM | TEMPERATURE: 97.9 F | RESPIRATION RATE: 19 BRPM | SYSTOLIC BLOOD PRESSURE: 153 MMHG

## 2018-04-11 VITALS
SYSTOLIC BLOOD PRESSURE: 130 MMHG | HEART RATE: 78 BPM | OXYGEN SATURATION: 93 % | DIASTOLIC BLOOD PRESSURE: 84 MMHG | RESPIRATION RATE: 20 BRPM

## 2018-04-11 LAB
A1AT SERPL-MCNC: 197 MG/DL (ref 100–190)
ACANTHOCYTES BLD QL SMEAR: (no result)
ALB/GLOB RATIO (SPE): 0.98 (ref 1.39–2.23)
ALBUMIN SERPL-MCNC: 2.6 GM/DL (ref 3.4–5)
ALP SERPL-CCNC: 265 U/L (ref 45–117)
ALT SERPL-CCNC: 166 U/L (ref 10–53)
AST SERPL-CCNC: 185 U/L (ref 15–37)
BASOPHILS NFR BLD AUTO: 1 % (ref 0–2)
BILIRUB SERPL-MCNC: 5.3 MG/DL (ref 0.2–1)
BUN SERPL-MCNC: 21 MG/DL (ref 7–18)
CALCIUM SERPL-MCNC: 8.1 MG/DL (ref 8.5–10.1)
CHLORIDE SERPL-SCNC: 106 MEQ/L (ref 98–107)
CREAT SERPL-MCNC: 0.76 MG/DL (ref 0.5–1)
ERYTHROCYTE [DISTWIDTH] IN BLOOD BY AUTOMATED COUNT: 19.1 % (ref 11.6–17.2)
GFR SERPLBLD BASED ON 1.73 SQ M-ARVRAT: 73 ML/MIN (ref 89–?)
GLUCOSE SERPL-MCNC: 157 MG/DL (ref 74–106)
HCO3 BLD-SCNC: 20.8 MEQ/L (ref 21–32)
HCT VFR BLD CALC: 25 % (ref 35–46)
HGB BLD-MCNC: 8.6 GM/DL (ref 11.6–15.3)
INR PPP: 2.1 RATIO
LYMPHOCYTES: 6 % (ref 9–44)
MCH RBC QN AUTO: 31.6 PG (ref 27–34)
MCHC RBC AUTO-ENTMCNC: 34.3 % (ref 32–36)
MCV RBC AUTO: 92 FL (ref 80–100)
MONOCYTES: 11 % (ref 0–8)
NEUTS BAND # BLD MANUAL: 23.4 TH/MM3 (ref 1.8–7.7)
NEUTS BAND NFR BLD: 10 % (ref 0–6)
NEUTS SEG NFR BLD MANUAL: 70 % (ref 16–70)
PLATELET # BLD: 141 TH/MM3 (ref 150–450)
PMV BLD AUTO: 11.3 FL (ref 7–11)
PROT SERPL-MCNC: 7.1 GM/DL (ref 6.4–8.2)
PROTHROMBIN TIME: 21 SEC (ref 9.8–11.6)
RBC # BLD AUTO: 2.72 MIL/MM3 (ref 4–5.3)
SODIUM SERPL-SCNC: 137 MEQ/L (ref 136–145)
TOXIC GRANULES BLD QL SMEAR: (no result)
WBC # BLD AUTO: 29.2 TH/MM3 (ref 4–11)

## 2018-04-11 PROCEDURE — 30233K1 TRANSFUSION OF NONAUTOLOGOUS FROZEN PLASMA INTO PERIPHERAL VEIN, PERCUTANEOUS APPROACH: ICD-10-PCS | Performed by: INTERNAL MEDICINE

## 2018-04-11 PROCEDURE — 07DR3ZX EXTRACTION OF ILIAC BONE MARROW, PERCUTANEOUS APPROACH, DIAGNOSTIC: ICD-10-PCS | Performed by: RADIOLOGY

## 2018-04-11 RX ADMIN — METOPROLOL TARTRATE SCH MG: 50 TABLET, FILM COATED ORAL at 20:14

## 2018-04-11 RX ADMIN — AZITHROMYCIN SCH MLS/HR: 500 INJECTION, POWDER, LYOPHILIZED, FOR SOLUTION INTRAVENOUS at 05:49

## 2018-04-11 RX ADMIN — ONDANSETRON PRN MG: 2 INJECTION, SOLUTION INTRAMUSCULAR; INTRAVENOUS at 08:35

## 2018-04-11 RX ADMIN — Medication SCH ML: at 20:14

## 2018-04-11 RX ADMIN — Medication SCH ML: at 08:36

## 2018-04-11 RX ADMIN — SODIUM CHLORIDE SCH MLS/HR: 900 INJECTION INTRAVENOUS at 05:49

## 2018-04-11 RX ADMIN — METOPROLOL TARTRATE SCH MG: 50 TABLET, FILM COATED ORAL at 08:35

## 2018-04-11 RX ADMIN — STANDARDIZED SENNA CONCENTRATE AND DOCUSATE SODIUM SCH TAB: 8.6; 5 TABLET, FILM COATED ORAL at 08:36

## 2018-04-11 RX ADMIN — DILTIAZEM HYDROCHLORIDE SCH MG: 120 CAPSULE, EXTENDED RELEASE ORAL at 08:35

## 2018-04-11 RX ADMIN — STANDARDIZED SENNA CONCENTRATE AND DOCUSATE SODIUM SCH TAB: 8.6; 5 TABLET, FILM COATED ORAL at 20:14

## 2018-04-11 NOTE — HHI.GIFU
Subjective


Remarks


Pt resting in bed


Reports some nausea, relieved with antiemetics


Denies emesis


Denies abdominal pain 


 (Thao Tay)





Objective


Vitals I&O





Vital Signs








  Date Time  Temp Pulse Resp B/P (MAP) Pulse Ox O2 Delivery O2 Flow Rate FiO2


 


4/11/18 12:11 97.8 100 19 146/75 97   


 


4/11/18 11:48 97.9 84 19 153/69    


 


4/11/18 07:55 97.7 83 18 120/66 (84) 98   


 


4/11/18 00:45 98.1 97 18 141/72 (95) 93   


 


4/10/18 19:49      Nasal Cannula 2.00 


 


4/10/18 18:25 97.5 89 19 142/70 (94) 91   


 


4/10/18 16:00 98.1 120 18 163/85 (111) 93   


 


4/10/18 16:00     96 Room Air  














I/O      


 


 4/10/18 4/10/18 4/10/18 4/11/18 4/11/18 4/11/18





 07:00 15:00 23:00 07:00 15:00 23:00


 


Intake Total 460 ml  240 ml 0 ml  


 


Balance 460 ml  240 ml 0 ml  


 


      


 


Intake Oral 360 ml  240 ml 0 ml  


 


IV Total 100 ml     


 


# Voids 2  2 2  


 


# Bowel Movements 0  0   








Laboratory





Laboratory Tests








Test


  4/11/18


08:02


 


White Blood Count 29.2 


 


Red Blood Count 2.72 


 


Hemoglobin 8.6 


 


Hematocrit 25.0 


 


Mean Corpuscular Volume 92.0 


 


Mean Corpuscular Hemoglobin 31.6 


 


Mean Corpuscular Hemoglobin


Concent 34.3 


 


 


Red Cell Distribution Width 19.1 


 


Platelet Count 141 


 


Mean Platelet Volume 11.3 


 


CBC Comment AUTO DIFF 


 


Differential Total Cells


Counted 100 


 


 


Neutrophils % (Manual) 70 


 


Band Neutrophils % 10 


 


Lymphocytes % 6 


 


Monocytes % 11 


 


Eosinophils % 2 


 


Basophils % 1 


 


Neutrophils # (Manual) 23.4 


 


Differential Comment


  FINAL DIFF


MANUAL


 


Toxic Granulation 1+ 


 


Platelet Estimate LOW 


 


Platelet Morphology Comment ENLARGED 


 


Ovalocytes  


 


Acanthocytes OCC 


 


Prothrombin Time 21.0 


 


Prothromb Time International


Ratio 2.1 


 


 


Blood Urea Nitrogen 21 


 


Creatinine 0.76 


 


Random Glucose 157 


 


Total Protein 7.1 


 


Albumin 2.6 


 


Calcium Level 8.1 


 


Alkaline Phosphatase 265 


 


Aspartate Amino Transf


(AST/SGOT) 185 


 


 


Alanine Aminotransferase


(ALT/SGPT) 166 


 


 


Total Bilirubin 5.3 


 


Sodium Level 137 


 


Potassium Level 3.8 


 


Chloride Level 106 


 


Carbon Dioxide Level 20.8 


 


Anion Gap 10 


 


Estimat Glomerular Filtration


Rate 73 


 








Imaging





Last Impressions








Chest X-Ray 4/9/18 0341 Signed





Impressions: 





 Service Date/Time:  Monday, April 9, 2018 03:58 - CONCLUSION: Bibasilar 





 opacities.     Ziyad Sexton MD 


 


Cholangiopancreatography MRI 4/9/18 0000 Signed





Impressions: 





 Service Date/Time:  Monday, April 9, 2018 18:50 - CONCLUSION:  Small to 

moderate 





 bilateral complex pleural effusions.  Mild periportal edema in the liver with 





 minimal ascites and anasarca.  No biliary ductal or pancreatic ductal 





 dilatation. Numerous gallstones.     Rogerio Parks MD 








Physical Exam


HEENT: Normocephalic; atraumatic; (+) icterus 


CHEST:  Even/unlabored 


CARDIAC:  Irregularly irregular 


ABDOMEN:  Soft, nondistended, nontender; bowel sounds active 


SKIN:  (+) jaundice.


CNS:  No focal deficits; alert and oriented times three.


 (Thao Tay [facility])





Assessment and Plan


Plan


ASSESSMENT


- elevated LFTs - could be r/t hemochromatosis but will get MRCP and liver w/u 

to r/o other cause elevated liver chem,


   obstruction.  denies heavy etoh.  US 3/16/18 showed gallstones, no duct 

dilatation, CT 3/16 showed suspected venous


   congestion liver.  hep panel at that time was negative. 


- anemia hgb 6.2 on admission - no obvious bleeding.  sees hematology for 

hemochromatosis, gets phlebotomy.


   on coumadin for AF.  last colonoscopy years ago in [place], normal.  


- thrombocytopenia - 





4/10/18  hem/onc now following, initiated w/u for leukemia, pt going for bm bx 

today.  MRCP noted, gallstones, 


  min ascites, anasarca, mild periportal edema, no ductal dilatation.


  serum iron, ferritin, saturation are high.  LFT elevation likely 2/2 iron 

overload.  no obvious GI bleeding.





(4/11) Pt reports some nausea, relieved with antiemetics, denies emesis.  

Denies abdominal pain.


  H/H remains stable over night. Hemoccult stool pending. Bone marrow biopsy 

pending. Pt reports


  last EGD was 3-4 years ago, unsure of findings. has never had colonoscopy. 

Denies history of GIB.


  Platelets remain about the same- 141


  INR- 2.1 --> 2 U FFP ordered for transfusion today 


  LFTs improving some today. Alk phos remains the same.  Liver VASQUEZ pending. 





Liver Work up


KATHLEEN negative, Ferritin-3311 Iron-233 %sat 99.1 AFP-2.7


AMA, ASMA, hepatitis panel, ceruloplasmin, A1A pending


MRCP (4/9) --> Mild periportal edema in the liver with minimal ascites and 

anasarca. No biliary ductal


  or pancreatic ductal dilatation. Numerous gallstones. 





PLAN


- Liver work up pending


- Add HFE due to elevated Ferritin


- Bone marrow biopsy pending


- Hemoccult stool pending


- EGD if suspicious for GIB


- Continue to monitor CBC, INR, and LFTs 


- Transfuse per hematology 


- Further recommendations based on clinical course and results of above





Pt has been seen and examined by myself and Dr. Ayala and this note is 

written on his behalf 


 (Thao Tay)


Plan


Patient was seen and examined, laying in bed comfortably, I waiting bone marrow 

biopsy so a little bit anxious, no sign of active bleeding, if the bone marrow 

was unremarkable and the patient will need GI workup and possible colonoscopy 

will follow up with you


 (Antony Ayala MD)











Thao Tay Apr 11, 2018 13:02


Antony Ayala MD Apr 11, 2018 13:59

## 2018-04-11 NOTE — RADRPT
EXAM DATE/TIME:  04/11/2018 13:18 

 

[facility] COMPARISON:     

No previous studies available for comparison.

 

 

INDICATIONS :      

Anemia, leukocytosis,  hemachromatosis

                     

 

 

 

SEDATION TIME:       

15 minutes

 

 

BIOPSY SITE:       

Right ilium

                     

 

MEDICATION(S):

1.) 0.5 mg midazolam (Versed) IV  

2.) 50 mcg fentanyl (Sublimaze) IV  

 

 

DEVICE(S):

1.) 11 gauge On-Control needle 

                     

 

MEDICAL HISTORY :     

Hypertension.   

 

SURGICAL HISTORY :     

Hysterectomy.   

 

ENCOUNTER:     

Initial

 

ACUITY:     

1 day

 

PAIN SCORE:     

0/10

 

LOCATION:      

pelvis

                     

A total of one core specimen(s) were obtained and sent to the laboratory for pathologic evaluation.

 

 

PROCEDURE:

1.   CT guided bone marrow biopsy.

 

 

Prior to the procedure informed consent was obtained.  Any appropriate prior imaging studies were rev
iewed.  Using automated exposure control and adjustment of the mA and/or kV according to patient size
, radiation dose was kept as low as reasonably achievable to obtain optimal diagnostic quality images
.  DICOM format image data is available electronically for review and comparison.  

 

The site was prepped in a sterile fashion.  Full sterile technique was used, including cap, mask, kemi
rile gloves and gown and a large sterile sheet.  Hand hygiene and 2% chlorhexidine and/or betadine/al
cohol prep was utilized per protocol for cutaneous antisepsis.  The skin and subcutaneous tissues wer
e infiltrated with local anesthetic solution.

 

With CT guidance the previously identified target was localized. Biopsy was performed using the presc
ribed needle as above.  Following biopsy marrow aspiration was performed with repeat puncture. Adequa
te hemostasis was obtained with compression at the puncture site.

 

Follow-up CT scan reveals no hemorrhage.

 

Conscious sedation was performed with the prescribed dosages and duration as above in the presence of
 an independent trained radiology nurse to assist in the monitoring of the patient.  EKG and oximetry
 remained stable throughout the procedure. The patient tolerated the procedure well and there were no
 complications.  The patient was sent to Radiology Outpatient Unit in stable condition.

 

CONCLUSION:     

1.  Uncomplicated CT guided bone marrow aspirate.

2.  Uncomplicated CT guided bone marrow biopsy.

 

 

 

 Mikel Felder MD FACR on April 11, 2018 at 15:10           

Board Certified Radiologist.

 This report was verified electronically.

## 2018-04-11 NOTE — PD.ONC.PN
Subjective


Subjective


Remarks


Afebrile overnight. 


Patient nervous about bone marrow biopsy. 


denies pain.


otherwise without complaint.





Objective


Data











  Date Time  Temp Pulse Resp B/P (MAP) Pulse Ox O2 Delivery O2 Flow Rate FiO2


 


4/11/18 07:55 97.7 83 18 120/66 (84) 98   


 


4/11/18 00:45 98.1 97 18 141/72 (95) 93   


 


4/10/18 19:49      Nasal Cannula 2.00 


 


4/10/18 18:25 97.5 89 19 142/70 (94) 91   


 


4/10/18 16:00 98.1 120 18 163/85 (111) 93   


 


4/10/18 16:00     96 Room Air  


 


4/10/18 12:00 98.1 105 18 176/86 (116) 92   














 4/11/18 4/11/18 4/11/18





 07:00 15:00 23:00


 


Intake Total 0 ml  


 


Balance 0 ml  








Result Diagram:  


4/11/18 0802                                                                   

             4/11/18 0802





Laboratory Results





Laboratory Tests








Test


  4/11/18


08:02


 


White Blood Count 29.2 TH/MM3 


 


Red Blood Count 2.72 MIL/MM3 


 


Hemoglobin 8.6 GM/DL 


 


Hematocrit 25.0 % 


 


Mean Corpuscular Volume 92.0 FL 


 


Mean Corpuscular Hemoglobin 31.6 PG 


 


Mean Corpuscular Hemoglobin


Concent 34.3 % 


 


 


Red Cell Distribution Width 19.1 % 


 


Platelet Count 141 TH/MM3 


 


Mean Platelet Volume 11.3 FL 


 


CBC Comment AUTO DIFF 


 


Differential Total Cells


Counted 100 


 


 


Neutrophils % (Manual) 70 % 


 


Band Neutrophils % 10 % 


 


Lymphocytes % 6 % 


 


Monocytes % 11 % 


 


Eosinophils % 2 % 


 


Basophils % 1 % 


 


Neutrophils # (Manual) 23.4 TH/MM3 


 


Differential Comment


  FINAL DIFF


MANUAL


 


Toxic Granulation 1+ 


 


Platelet Estimate LOW 


 


Platelet Morphology Comment ENLARGED 


 


Ovalocytes  


 


Acanthocytes OCC 


 


Prothrombin Time 21.0 SEC 


 


Prothromb Time International


Ratio 2.1 RATIO 


 


 


Blood Urea Nitrogen 21 MG/DL 


 


Creatinine 0.76 MG/DL 


 


Random Glucose 157 MG/DL 


 


Total Protein 7.1 GM/DL 


 


Albumin 2.6 GM/DL 


 


Calcium Level 8.1 MG/DL 


 


Alkaline Phosphatase 265 U/L 


 


Aspartate Amino Transf


(AST/SGOT) 185 U/L 


 


 


Alanine Aminotransferase


(ALT/SGPT) 166 U/L 


 


 


Total Bilirubin 5.3 MG/DL 


 


Sodium Level 137 MEQ/L 


 


Potassium Level 3.8 MEQ/L 


 


Chloride Level 106 MEQ/L 


 


Carbon Dioxide Level 20.8 MEQ/L 


 


Anion Gap 10 MEQ/L 


 


Estimat Glomerular Filtration


Rate 73 ML/MIN 


 











Administered Medications








 Medications


  (Trade)  Dose


 Ordered  Sig/Kellie


 Route


 PRN Reason  Start Time


 Stop Time Status Last Admin


Dose Admin


 


 Ceftriaxone


 Sodium 1000 mg/


 Sodium Chloride  100 ml @ 


 200 mls/hr  Q24H


 IV


   4/10/18 06:00


    4/11/18 05:49


 


 


 Azithromycin 500


 mg/Sodium Chloride  250 ml @ 


 250 mls/hr  Q24H


 IV


   4/10/18 06:00


    4/11/18 05:49


 


 


 Sodium Chloride


  (NS Flush)  2 ml  BID


 IV FLUSH


   4/9/18 09:00


    4/10/18 20:40


 


 


 Ondansetron HCl


  (Zofran Inj)  4 mg  Q6H  PRN


 IVP


 NAUSEA OR VOMITING  4/9/18 05:00


    4/11/18 08:35


 


 


 Oxycodone HCl


  (Roxicodone)  5 mg  Q4H  PRN


 PO


 PAIN SCALE 3 TO 5  4/9/18 05:00


    4/9/18 20:19


 


 


 Senna/Docusate


 Sodium


  (Sita-Colace)  1 tab  BID


 PO


   4/9/18 09:00


    4/10/18 20:40


 


 


 Diltiazem HCl


  (Cardizem Cd)  120 mg  DAILY


 PO


   4/9/18 09:00


    4/11/18 08:35


 


 


 Metoprolol


 Tartrate


  (Lopressor)  50 mg  Q12HR


 PO


   4/10/18 16:00


    4/11/18 08:35


 








Objective Remarks


GENERAL: pleasant female, lying in bed in nad.


SKIN: Warm and dry.


HEAD: Normocephalic.


EYES: No injection or drainage. 


NECK: Supple, trachea midline. 


CARDIOVASCULAR: IRR


RESPIRATORY: Breath sounds equal bilaterally. No accessory muscle use.


GASTROINTESTINAL: Abdomen soft, non-tender, nondistended. 


EXTREMITIES: No cyanosis


NEUROLOGICAL: awake and alert. normal speech. moving extremities.





Assessment/Plan


Problem List:  


(1) leukocytosis + anemia


Plan:  4/11: give 2 units FFP. bone marrow biopsy today.  will start heparin 

gtt this evening. 


--most likely due to leukemia until proven otherwise.


--JAK2 and BCR-ABL negative


--LDH high, haptoglobin low, rubén negative. 


--heme negative stools. 


--SPEP pending. 


--RF negative





(2) Transaminitis


ICD Codes:  R74.0 - Nonspecific elevation of levels of transaminase and lactic 

acid dehydrogenase [LDH]


Status:  Acute


Plan:  --unclear etiology





(3) Hemochromatosis


ICD Codes:  E83.119 - Hemochromatosis, unspecified


Plan:  --Ferritin on 4/10 was 3311 (most likely erroneous, drawn after the 

blood transfusion).


--repeat ferritin drawn on blood 0345AM on 4/9 was 1277





Assessment


82y/o with severe anemia and leukocytosis, suspected leukemia, admitted with 

weakness, fatigue, dizziness.


h/o Atrial fibrillation, hemochromatosis, hypertension, anemia and 


leukocytosis.


Attending Statement


The exam, history, and the medical decision-making described in the above note 

were completed with the assistance of the mid-level provider. I reviewed and 

agree with the findings presented.  I attest that I had a face-to-face 

encounter with the patient on the same day, and personally performed and 

documented my assessment and findings in the medical record.





c/o nausea, and weakness.


for BM bx today. FFP prior to Bx


Ferritin 1277 prior to Blood tx on arrival to ER. Ferritin 3311 is after the tx

.. Last admission (3 weeks ago) Ferritin 977.


GI has order HFE. I have cancel the order as she had it on last admission . She 

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


She has liver failure the cause is unknown. May benefit from liver bx when more 

stable. Will D/W Dr roland.


Due to severe anemia not a candidate for therapeutic phlebotomy for 

Hemochromatosis. Will consider Exjade after BM bx result.











Mary Rousseau Apr 11, 2018 11:02


Teto Wasserman MD Apr 11, 2018 18:51

## 2018-04-11 NOTE — HHI.PR
Subjective


Remarks


The patient was seen following her bone marrow biopsy.  She denied any pain.  

She was anxious about the results.  Her  was at the bedside.  She 

mentioned she has had a cough for the past couple of days and had some 

shortness of breath this morning. Discussed with nursing.





Objective


Vitals





Vital Signs








  Date Time  Temp Pulse Resp B/P (MAP) Pulse Ox O2 Delivery O2 Flow Rate FiO2


 


4/11/18 14:40  81 18 135/75 (95) 94   


 


4/11/18 14:10  78 20 130/84 (99) 93   


 


4/11/18 13:55 98.2 74 20 131/80 (97) 92   


 


4/11/18 12:11 97.8 100 19 146/75 97   


 


4/11/18 11:48 97.9 84 19 153/69    


 


4/11/18 07:55 97.7 83 18 120/66 (84) 98   


 


4/11/18 00:45 98.1 97 18 141/72 (95) 93   


 


4/10/18 19:49      Nasal Cannula 2.00 


 


4/10/18 18:25 97.5 89 19 142/70 (94) 91   


 


4/10/18 16:00 98.1 120 18 163/85 (111) 93   


 


4/10/18 16:00     96 Room Air  














I/O      


 


 4/10/18 4/10/18 4/10/18 4/11/18 4/11/18 4/11/18





 07:00 15:00 23:00 07:00 15:00 23:00


 


Intake Total 460 ml  240 ml 0 ml  328 ml


 


Balance 460 ml  240 ml 0 ml  328 ml


 


      


 


Intake Oral 360 ml  240 ml 0 ml  


 


IV Total 100 ml     


 


FFP      308 ml


 


Blood Product IV Normal Saline Flush      20 ml


 


# Voids 2  2 2  


 


# Bowel Movements 0  0   








Result Diagram:  


4/11/18 0802                                                                   

             4/11/18 0802





Imaging





Last Impressions








Bone Biopsy CT 4/11/18 0818 Signed





Impressions: 





 Service Date/Time:  Wednesday, April 11, 2018 13:18 - CONCLUSION:  1.  





 Uncomplicated CT guided bone marrow aspirate. 2.  Uncomplicated CT guided bone 





 marrow biopsy.     Mikel Felder MD  FACR


 


Chest X-Ray 4/9/18 0341 Signed





Impressions: 





 Service Date/Time:  Monday, April 9, 2018 03:58 - CONCLUSION: Bibasilar 





 opacities.     Ziyad Sexton MD 


 


Cholangiopancreatography MRI 4/9/18 0000 Signed





Impressions: 





 Service Date/Time:  Monday, April 9, 2018 18:50 - CONCLUSION:  Small to 

moderate 





 bilateral complex pleural effusions.  Mild periportal edema in the liver with 





 minimal ascites and anasarca.  No biliary ductal or pancreatic ductal 





 dilatation. Numerous gallstones.     Rogerio Parks MD 








Objective Remarks


GENERAL: This is a well-nourished, well-developed patient, in no apparent 

distress.


SKIN: No rashes, ecchymoses or lesions. Cool and dry. Jaundiced. 


HEAD: Atraumatic. Normocephalic. No temporal or scalp tenderness.


EYES: Pupils equal round and reactive. Extraocular motions intact. No scleral 

icterus. No injection or drainage. 


ENT: Nose without bleeding, purulent drainage or septal hematoma. Throat 

without erythema, tonsillar hypertrophy or exudate. Uvula midline. Airway 

patent.


NECK: Trachea midline. No JVD or lymphadenopathy. Supple, nontender, no 

meningeal signs.


CARDIOVASCULAR: Regular rate and rhythm without murmurs, gallops, or rubs. 


RESPIRATORY: Mild crackles at the right base. 


GASTROINTESTINAL: Abdomen soft, non-tender, nondistended. No hepato-splenomegaly

, or palpable masses. No guarding.


MUSCULOSKELETAL: Extremities without clubbing, cyanosis, or edema. No joint 

tenderness, effusion, or edema noted. 


NEUROLOGICAL: Awake and alert. Cranial nerves II through XII intact.  Motor and 

sensory grossly within normal limits. Five out of 5 muscle strength in all 

muscle groups.  Normal speech.


PSYCH: Mood and affect appropriate.


Procedures


Bone marrow biopsy 4/11


Medications and IVs





Current Medications








 Medications


  (Trade)  Dose


 Ordered  Sig/Kellie


 Route  Start Time


 Stop Time Status Last Admin


 


 Ceftriaxone


 Sodium 1000 mg/


 Sodium Chloride  100 ml @ 


 200 mls/hr  Q24H


 IV  4/10/18 06:00


    4/11/18 05:49


 


 


 Azithromycin 500


 mg/Sodium Chloride  250 ml @ 


 250 mls/hr  Q24H


 IV  4/10/18 06:00


    4/11/18 05:49


 


 


  (Duoneb Neb)  1 ampule  Q4HR NEB  PRN


 NEB  4/9/18 05:00


     


 


 


  (NS Flush)  2 ml  UNSCH  PRN


 IV FLUSH  4/9/18 05:00


     


 


 


  (NS Flush)  2 ml  BID


 IV FLUSH  4/9/18 09:00


    4/10/18 20:40


 


 


  (Zofran Inj)  4 mg  Q6H  PRN


 IVP  4/9/18 05:00


    4/11/18 08:35


 


 


  (Morphine Inj)  2 mg  Q3H  PRN


 IV PUSH  4/9/18 05:00


     


 


 


  (Roxicodone)  5 mg  Q4H  PRN


 PO  4/9/18 05:00


    4/9/18 20:19


 


 


  (Sita-Colace)  1 tab  BID


 PO  4/9/18 09:00


    4/10/18 20:40


 


 


  (Milk Of


 Magnesia Liq)  30 ml  Q12H  PRN


 PO  4/9/18 05:00


     


 


 


  (Senokot)  17.2 mg  Q12H  PRN


 PO  4/9/18 05:00


     


 


 


  (Dulcolax Supp)  10 mg  DAILY  PRN


 RECTAL  4/9/18 05:00


     


 


 


  (Lactulose Liq)  30 ml  DAILY  PRN


 PO  4/9/18 05:00


     


 


 


  (Cardizem Cd)  120 mg  DAILY


 PO  4/9/18 09:00


    4/11/18 08:35


 


 


  (Lopressor)  50 mg  Q12HR


 PO  4/10/18 16:00


    4/11/18 08:35


 


 


 Sodium Chloride  250 ml @ 


 15 mls/hr  ONCE  ONCE


 IV  4/11/18 11:30


 4/12/18 04:09  4/11/18 11:19


 


 


  (Tylenol)  650 mg  Q4H  PRN


 PO  4/11/18 10:45


 4/11/18 23:59  4/11/18 11:18


 


 


  (Benadryl)  25 mg  Q4H  PRN


 PO  4/11/18 10:45


 4/11/18 23:59  4/11/18 11:22


 











A/P


Problem List:  


(1) Anemia


ICD Code:  D64.9 - Anemia, unspecified


Status:  Acute


Assessment and Plan


Acute on chronic anemia with history of hemochromatosis and leukocytosis


S/p PRBC transfusion. Hematology consult appreciated. Concern for leukemia. 

Bone marrow biopsy performed 4/11.


- will continue to monitor CBC.


- transfer to oncology floor.


- GI following.





CAP


With leukocytosis and bibasilar opacities on CXR.


- started on IV ceftriaxone and azithromycin.





Atrial fibrillation


On Coumadin with supratherpeutic INR. HR controlled.


- continue Cardizem and metoprolol.


- Coumadin on hold. Vit K per hematology.





Hypertension


Well controlled.


- resumed Cardizem and metoprolol.





Elevated LFT's


Recent hepatitis panel negative and liver US with gallstones with no biliary 

duct dilatation. GI consult appreciated. MRCP noted.


- follow LFTs. Improving.


- GI following.





DVT prophylaxis: Coumadin on hold


Discharge Planning


Transfer to oncology floor











Krishan Mendoza DO Apr 11, 2018 15:36

## 2018-04-12 VITALS — HEART RATE: 110 BPM | DIASTOLIC BLOOD PRESSURE: 90 MMHG | SYSTOLIC BLOOD PRESSURE: 145 MMHG

## 2018-04-12 VITALS — HEART RATE: 112 BPM | SYSTOLIC BLOOD PRESSURE: 105 MMHG | DIASTOLIC BLOOD PRESSURE: 63 MMHG

## 2018-04-12 VITALS
RESPIRATION RATE: 20 BRPM | SYSTOLIC BLOOD PRESSURE: 160 MMHG | HEART RATE: 116 BPM | TEMPERATURE: 97.5 F | DIASTOLIC BLOOD PRESSURE: 78 MMHG | OXYGEN SATURATION: 96 %

## 2018-04-12 VITALS — OXYGEN SATURATION: 100 %

## 2018-04-12 VITALS — SYSTOLIC BLOOD PRESSURE: 139 MMHG | DIASTOLIC BLOOD PRESSURE: 80 MMHG | HEART RATE: 135 BPM

## 2018-04-12 VITALS
TEMPERATURE: 97.8 F | OXYGEN SATURATION: 97 % | HEART RATE: 94 BPM | SYSTOLIC BLOOD PRESSURE: 155 MMHG | DIASTOLIC BLOOD PRESSURE: 79 MMHG | RESPIRATION RATE: 18 BRPM

## 2018-04-12 VITALS
SYSTOLIC BLOOD PRESSURE: 97 MMHG | DIASTOLIC BLOOD PRESSURE: 60 MMHG | TEMPERATURE: 92.7 F | OXYGEN SATURATION: 90 % | HEART RATE: 90 BPM | RESPIRATION RATE: 26 BRPM

## 2018-04-12 VITALS
RESPIRATION RATE: 18 BRPM | DIASTOLIC BLOOD PRESSURE: 82 MMHG | OXYGEN SATURATION: 99 % | TEMPERATURE: 97.7 F | HEART RATE: 110 BPM | SYSTOLIC BLOOD PRESSURE: 155 MMHG

## 2018-04-12 VITALS — DIASTOLIC BLOOD PRESSURE: 74 MMHG | SYSTOLIC BLOOD PRESSURE: 141 MMHG

## 2018-04-12 VITALS — DIASTOLIC BLOOD PRESSURE: 88 MMHG | SYSTOLIC BLOOD PRESSURE: 129 MMHG

## 2018-04-12 LAB
ALBUMIN SERPL-MCNC: 1.9 GM/DL (ref 3.4–5)
ALBUMIN SERPL-MCNC: 2.9 GM/DL (ref 3.4–5)
ALP SERPL-CCNC: 244 U/L (ref 45–117)
ALP SERPL-CCNC: 280 U/L (ref 45–117)
ALT SERPL-CCNC: 145 U/L (ref 10–53)
AST SERPL-CCNC: 163 U/L (ref 15–37)
BILIRUB INDIRECT SERPL-MCNC: 2.4 MG/DL (ref 0–0.8)
BILIRUB SERPL-MCNC: 5.8 MG/DL (ref 0.2–1)
BILIRUB SERPL-MCNC: 6.7 MG/DL (ref 0.2–1)
BUN SERPL-MCNC: 24 MG/DL (ref 7–18)
BUN SERPL-MCNC: 27 MG/DL (ref 7–18)
CALCIUM SERPL-MCNC: 6.8 MG/DL (ref 8.5–10.1)
CALCIUM SERPL-MCNC: 8.2 MG/DL (ref 8.5–10.1)
CALCIUM TP COR SERPL-MCNC: 7.7 MG/DL (ref 8.5–10.1)
CHLORIDE SERPL-SCNC: 106 MEQ/L (ref 98–107)
CHLORIDE SERPL-SCNC: 107 MEQ/L (ref 98–107)
CREAT SERPL-MCNC: 0.77 MG/DL (ref 0.5–1)
CREAT SERPL-MCNC: 1.14 MG/DL (ref 0.5–1)
D-DIMER: (no result) MG/L FEU (ref 0–0.5)
DIRECT BILIRUBIN ADULT: 4.3 MG/DL (ref 0–0.2)
ERYTHROCYTE [DISTWIDTH] IN BLOOD BY AUTOMATED COUNT: 19.1 % (ref 11.6–17.2)
ERYTHROCYTE [DISTWIDTH] IN BLOOD BY AUTOMATED COUNT: 20 % (ref 11.6–17.2)
FIBRINOGEN PPP-MCNC: (no result) MG/DL (ref 227–377)
GFR SERPLBLD BASED ON 1.73 SQ M-ARVRAT: 46 ML/MIN (ref 89–?)
GFR SERPLBLD BASED ON 1.73 SQ M-ARVRAT: 72 ML/MIN (ref 89–?)
GLUCOSE SERPL-MCNC: 133 MG/DL (ref 74–106)
GLUCOSE SERPL-MCNC: 65 MG/DL (ref 74–106)
HCO3 BLD-SCNC: 15 MEQ/L (ref 21–32)
HCO3 BLD-SCNC: 21.8 MEQ/L (ref 21–32)
HCT VFR BLD CALC: 18.6 % (ref 35–46)
HCT VFR BLD CALC: 23.2 % (ref 35–46)
HGB BLD-MCNC: 5.9 GM/DL (ref 11.6–15.3)
HGB BLD-MCNC: 7.9 GM/DL (ref 11.6–15.3)
INR PPP: 7.2 RATIO
LACTIC ACID SEPSIS PROTOCOL: 23.7 MMOL/L (ref 0.4–2)
MAGNESIUM SERPL-MCNC: 1.8 MG/DL (ref 1.5–2.5)
MAGNESIUM SERPL-MCNC: 2.3 MG/DL (ref 1.5–2.5)
MCH RBC QN AUTO: 31.1 PG (ref 27–34)
MCH RBC QN AUTO: 31.5 PG (ref 27–34)
MCHC RBC AUTO-ENTMCNC: 31.5 % (ref 32–36)
MCHC RBC AUTO-ENTMCNC: 33.9 % (ref 32–36)
MCV RBC AUTO: 93 FL (ref 80–100)
MCV RBC AUTO: 98.7 FL (ref 80–100)
MITOCHONDRIA AB SER QL: (no result) U (ref ?–20)
PHOSPHATE SERPL-MCNC: 7.1 MG/DL (ref 2.5–4.9)
PLATELET # BLD: 130 TH/MM3 (ref 150–450)
PLATELET # BLD: 86 TH/MM3 (ref 150–450)
PMV BLD AUTO: 11.3 FL (ref 7–11)
PMV BLD AUTO: 9.3 FL (ref 7–11)
PROT SERPL-MCNC: 5.4 GM/DL (ref 6.4–8.2)
PROT SERPL-MCNC: 7.3 GM/DL (ref 6.4–8.2)
PROTHROMBIN TIME: 71.7 SEC (ref 9.8–11.6)
RBC # BLD AUTO: 1.89 MIL/MM3 (ref 4–5.3)
RBC # BLD AUTO: 2.49 MIL/MM3 (ref 4–5.3)
SODIUM SERPL-SCNC: 139 MEQ/L (ref 136–145)
SODIUM SERPL-SCNC: 150 MEQ/L (ref 136–145)
WBC # BLD AUTO: 27.1 TH/MM3 (ref 4–11)
WBC # BLD AUTO: 43.6 TH/MM3 (ref 4–11)

## 2018-04-12 PROCEDURE — 0W9930Z DRAINAGE OF RIGHT PLEURAL CAVITY WITH DRAINAGE DEVICE, PERCUTANEOUS APPROACH: ICD-10-PCS | Performed by: INTERNAL MEDICINE

## 2018-04-12 PROCEDURE — 03HY32Z INSERTION OF MONITORING DEVICE INTO UPPER ARTERY, PERCUTANEOUS APPROACH: ICD-10-PCS | Performed by: INTERNAL MEDICINE

## 2018-04-12 PROCEDURE — 5A1935Z RESPIRATORY VENTILATION, LESS THAN 24 CONSECUTIVE HOURS: ICD-10-PCS | Performed by: INTERNAL MEDICINE

## 2018-04-12 PROCEDURE — 4A133B1 MONITORING OF ARTERIAL PRESSURE, PERIPHERAL, PERCUTANEOUS APPROACH: ICD-10-PCS | Performed by: INTERNAL MEDICINE

## 2018-04-12 PROCEDURE — 4A133J1 MONITORING OF ARTERIAL PULSE, PERIPHERAL, PERCUTANEOUS APPROACH: ICD-10-PCS | Performed by: INTERNAL MEDICINE

## 2018-04-12 PROCEDURE — 06HM33Z INSERTION OF INFUSION DEVICE INTO RIGHT FEMORAL VEIN, PERCUTANEOUS APPROACH: ICD-10-PCS | Performed by: INTERNAL MEDICINE

## 2018-04-12 PROCEDURE — 0BH17EZ INSERTION OF ENDOTRACHEAL AIRWAY INTO TRACHEA, VIA NATURAL OR ARTIFICIAL OPENING: ICD-10-PCS | Performed by: INTERNAL MEDICINE

## 2018-04-12 RX ADMIN — SODIUM CHLORIDE SCH MLS/HR: 900 INJECTION INTRAVENOUS at 06:02

## 2018-04-12 RX ADMIN — STANDARDIZED SENNA CONCENTRATE AND DOCUSATE SODIUM SCH TAB: 8.6; 5 TABLET, FILM COATED ORAL at 08:08

## 2018-04-12 RX ADMIN — AZITHROMYCIN SCH MLS/HR: 500 INJECTION, POWDER, LYOPHILIZED, FOR SOLUTION INTRAVENOUS at 06:02

## 2018-04-12 RX ADMIN — METOPROLOL TARTRATE SCH MG: 50 TABLET, FILM COATED ORAL at 08:06

## 2018-04-12 RX ADMIN — ONDANSETRON PRN MG: 2 INJECTION, SOLUTION INTRAMUSCULAR; INTRAVENOUS at 08:06

## 2018-04-12 RX ADMIN — Medication SCH ML: at 08:05

## 2018-04-12 RX ADMIN — DILTIAZEM HYDROCHLORIDE SCH MG: 120 CAPSULE, EXTENDED RELEASE ORAL at 08:08

## 2018-04-12 NOTE — PD.PROCEDR
Procedure Note


Procedure


Procedure: Arterial Line Placement


Left brachial arterial line





Diagnosis: Cardiogenic shock





Indications: Need for beat to beat hemodynamic monitoring





Consent: Emergent





Description of the Procedure:  The left arm was prepped and draped sterilely.  1

% lidocaine was used for local anesthesia. The pulse was located and a needle 

was advanced into the artery.  A 20 gauge, 12 cm catheter was advanced into the 

artery using a modified Seldinger technique.  The catheter was sutured to the 

skin and a sterile dressing was applied.  The catheter was connected to a 

pressure transducer and an arterial waveform was noted.





There were no immediate complications noted.  There was minimal EBL.





I personally performed the procedure.











Diego Eisenberg MD Apr 12, 2018 11:16

## 2018-04-12 NOTE — RADRPT
EXAM DATE/TIME:  04/12/2018 12:03 

 

[facility] COMPARISON:     

CHEST SINGLE AP, April 12, 2018, 10:24.  CT PULMONARY ANGIOGRAM, March 15, 2018, 18:09.

 

 

INDICATIONS :     

Evaluate for embolism.

                      

 

IV CONTRAST:     

60 cc Omnipaque 350 (iohexol) IV 

 

 

RADIATION DOSE:     

17.36 CTDIvol (mGy) 

 

 

MEDICAL HISTORY :     

Hypertension.  AFIB

 

SURGICAL HISTORY :      

Hysterectomy. 

 

ENCOUNTER:      

Initial

 

ACUITY:      

1 day

 

PAIN SCALE:      

Non-responsive

 

LOCATION:        

chest 

 

TECHNIQUE:     

Volumetric scanning of the chest was performed using a pulmonary embolism protocol MIP images were re
constructed.  Using automated exposure control and adjustment of the mA and/or kV according to patien
t size, radiation dose was kept as low as reasonably achievable to obtain optimal diagnostic quality 
images.   DICOM format image data is available electronically for review and comparison.  

 

Follow-up recommendations for detected pulmonary nodules are based at a minimum on nodule size and pa
tient risk factors according to Fleischner Society Guidelines.

 

FINDINGS:     

 

PULMONARY ARTERIES:

No filling defects are seen in the pulmonary arteries through the segmental level.

 

LUNGS:     

The lungs demonstrate diffuse mixed interstitial and alveolar consolidation of the alveolar consolida
tion the most prominent in the right upper lobe. There is suspected atelectasis of the lower lobes bi
laterally secondary to the effusions.

 

PLEURAE:     

There are moderate bilateral pleural effusions being greater on right than left.

 

MEDIASTINUM:     

ET tube tip is draped in the upper aspect of the right mainstem bronchus. There is good visualization
 of the great vessels of the middle mediastinum.  No evidence of mediastinal or hilar adenopathy/mass
. Coronary artery calcifications are present. There is reflux of contrast into the hepatic veins whic
h is can be seen with right heart failure

 

MUSCULOSKELETAL:     

Within normal limits for patient age.

 

MISCELLANEOUS:     

The visualized upper abdominal organs demonstrate no acute abnormality.

 

CONCLUSION:     

1. No pulmonary embolus.

2. Diffuse consolidation likely related to diffuse processes such as edema, diffuse infection or ARDS
.

3. ET tube in the right mainstem bronchus. This should be pulled back 3-4 cm.

4. Moderate bilateral pleural effusions.

5. Reflux of contrast into the hepatic veins which can be seen with right heart failure.

 

 

 

 

 Anderson Poe MD on April 12, 2018 at 12:11           

Board Certified Radiologist.

 This report was verified electronically.

## 2018-04-12 NOTE — DEATH SUM
Death Pronouncement


Date Pronounced :  2018


Time Of Death:  14:16


Pronouncement


Called to pronounce death of patient.  Identified patient as Barb Jack 

with wrist band MR# [id number].  Patient with no cardiac activity in 2 separate 

leads and no palpable/auscible cardiac activity.  Patient with no spontaneous 

respirations, no corneal reflex or response to painful stimuli.  Pupils fixed 

and dilated.


Preliminary Cause of Death:  Cardiac arrest











Diego Eisenberg MD 2018 14:18

## 2018-04-12 NOTE — PD.PROCEDR
Procedure Note


Procedure


Endotracheal Intubation





Diagnosis: Cardiogenic shock





Indications: PEA arrest with acute hypoxic and hypercarbic respiratory failure





Consent: Emergent





Anesthesia: none





Description of the Procedure:





The patient was positioned in the sniffing position.  Pre-oxygenation was 

performed using a active bag valve masking.  No anesthesia was required.  A 

Figueroa #4 was used for laryngoscopy and a Grade IIb view was obtained with 

cricoid pressure. A 8.0 cuffed endotracheal tube was inserted atraumatically 

through the vocal cords.  Confirmation of correct endotracheal tube placement 

was made by equal and bilateral breath sounds and colorimetric CO2 detection.  

The endotracheal tube was secured at 22 cm at the teeth.





There were no immediate complications noted.  CPR was in progress and continued.





A chest x-ray has been ordered.





I personally performed the procedure.











Diego Eisenberg MD Apr 12, 2018 11:16

## 2018-04-12 NOTE — HHI.GIFU
Subjective


Remarks


Pt was transferred to ICU


Per RN pt found to be unresponsive and pulseless


S/P ACLS with ROSC


Pt now intubated on Levo and Epi gtt


 (Thao Tay)


Remarks


Chart was reviewed, event was noted, unfortunately patient secondary to DIC


 (Antony Ayala MD)





Objective


Vitals I&O





Vital Signs








  Date Time  Temp Pulse Resp B/P (MAP) Pulse Ox O2 Delivery O2 Flow Rate FiO2


 


4/12/18 10:30     100   100


 


4/12/18 10:15     100   100


 


4/12/18 08:00 97.7 110 18 155/82 (106) 99   


 


4/12/18 04:00 97.5 116 20 160/78 (105) 96   


 


4/12/18 00:00 97.8 94 18 155/79 (104) 97   


 


4/11/18 21:03 97.5 99 16 135/76 94   


 


4/11/18 20:00 97.5 102 16 150/76 (100)    


 


4/11/18 19:46      Nasal Cannula 2.00 


 


4/11/18 18:15 98.0 105 17 144/72 (96) 94   


 


4/11/18 18:15 98.0 105 17 144/72 94   


 


4/11/18 18:00 98.5 94 18 141/73 96   


 


4/11/18 16:00 97.8 82 18 140/67 (91) 96   


 


4/11/18 14:40  81 18 135/75 (95) 94   


 


4/11/18 14:10  78 20 130/84 (99) 93   


 


4/11/18 13:55 98.2 74 20 131/80 (97) 92   


 


4/11/18 12:11 97.8 100 19 146/75 97   


 


4/11/18 11:48 97.9 84 19 153/69    














I/O      


 


 4/11/18 4/11/18 4/11/18 4/12/18 4/12/18 4/12/18





 07:00 15:00 23:00 07:00 15:00 23:00


 


Intake Total 0 ml  707 ml   


 


Balance 0 ml  707 ml   


 


      


 


Intake Oral 0 ml     


 


FFP   685 ml   


 


Blood Product IV Normal Saline Flush   22 ml   


 


# Voids 2 2  3  


 


# Bowel Movements  1    








Laboratory





Laboratory Tests








Test


  4/11/18


13:15 4/12/18


05:26 4/12/18


10:20


 


White Blood Count  27.1  


 


Red Blood Count  2.49  


 


Hemoglobin  7.9  


 


Hematocrit  23.2  


 


Mean Corpuscular Volume  93.0  


 


Mean Corpuscular Hemoglobin  31.5  


 


Mean Corpuscular Hemoglobin


Concent 


  33.9 


  


 


 


Red Cell Distribution Width  19.1  


 


Platelet Count  130  


 


Mean Platelet Volume  11.3  


 


Blood Urea Nitrogen  24  


 


Creatinine  0.77  


 


Random Glucose  133  


 


Total Protein  7.3  


 


Albumin  2.9  


 


Calcium Level  8.2  


 


Magnesium Level  1.8  


 


Alkaline Phosphatase  280  


 


Aspartate Amino Transf


(AST/SGOT) 


  163 


  


 


 


Alanine Aminotransferase


(ALT/SGPT) 


  145 


  


 


 


Total Bilirubin  6.7  


 


Direct Bilirubin  4.3  


 


Sodium Level  139  


 


Potassium Level  3.7  


 


Chloride Level  106  


 


Carbon Dioxide Level  21.8  


 


Anion Gap  11  


 


Estimat Glomerular Filtration


Rate 


  72 


  


 


 


Indirect Bilirubin  2.4  


 


Blood Gas Puncture Site   ART LINE 


 


Blood Gas Patient Temperature   98.6 


 


Blood Gas HCO3   8 


 


Blood Gas Base Excess   -21.6 


 


Blood Gas Oxygen Saturation   96 


 


Arterial Blood pH   6.97 


 


Arterial Blood Partial


Pressure CO2 


  


  36 


 


 


Arterial Blood Partial


Pressure O2 


  


  185 


 


 


Arterial Blood Oxygen Content   9.3 


 


Arterial Blood


Carboxyhemoglobin 


  


  0.5 


 


 


Arterial Blood Methemoglobin   1.2 


 


Blood Gas Hemoglobin   6.6 


 


Oxygen Delivery Device   VENTILATOR 


 


Blood Gas Ventilator Setting


  


  


  PRVC24/500/1.0/+8


 


 


Blood Gas Inspired Oxygen   100 














 Date/Time


Source Procedure


Growth Status


 


 


 4/12/18 09:05


Stool Stool Stool Occult Blood (NURY)


Pending Received








Imaging





Last Impressions








Bone Biopsy CT 4/11/18 0818 Signed





Impressions: 





 Service Date/Time:  Wednesday, April 11, 2018 13:18 - CONCLUSION:  1.  





 Uncomplicated CT guided bone marrow aspirate. 2.  Uncomplicated CT guided bone 





 marrow biopsy.     Mikel Felder MD  FACR


 


Chest X-Ray 4/9/18 0341 Signed





Impressions: 





 Service Date/Time:  Monday, April 9, 2018 03:58 - CONCLUSION: Bibasilar 





 opacities.     Ziyad Sexton MD 


 


Cholangiopancreatography MRI 4/9/18 0000 Signed





Impressions: 





 Service Date/Time:  Monday, April 9, 2018 18:50 - CONCLUSION:  Small to 

moderate 





 bilateral complex pleural effusions.  Mild periportal edema in the liver with 





 minimal ascites and anasarca.  No biliary ductal or pancreatic ductal 





 dilatation. Numerous gallstones.     Rogerio Parks MD 








Physical Exam


HEENT:  (+) icterus 


CHEST:  Respirations synchronized with vent via ETT


CARDIAC:  Irregularly irregular, tachycardic 


ABDOMEN:  Distended, soft, bowel sounds absent 


SKIN:  (+) jaundice.


CNS:  Unresponsive 


 (Thao Tay)





Assessment and Plan


Plan


- elevated LFTs - could be r/t hemochromatosis but will get MRCP and liver w/u 

to r/o other cause elevated liver chem,


   obstruction.  denies heavy etoh.  US 3/16/18 showed gallstones, no duct 

dilatation, CT 3/16 showed suspected venous


   congestion liver.  hep panel at that time was negative. 


- anemia hgb 6.2 on admission - no obvious bleeding.  sees hematology for 

hemochromatosis, gets phlebotomy.


   on coumadin for AF.  last colonoscopy years ago in [place], normal.  


- thrombocytopenia - 





4/10/18  hem/onc now following, initiated w/u for leukemia, pt going for bm bx 

today.  MRCP noted, gallstones, 


  min ascites, anasarca, mild periportal edema, no ductal dilatation.


  serum iron, ferritin, saturation are high.  LFT elevation likely 2/2 iron 

overload.  no obvious GI bleeding.





(4/11) Pt reports some nausea, relieved with antiemetics, denies emesis.  

Denies abdominal pain.


  H/H remains stable over night. Hemoccult stool pending. Bone marrow biopsy 

pending. Pt reports


  last EGD was 3-4 years ago, unsure of findings. has never had colonoscopy. 

Denies history of GIB.


  Platelets remain about the same- 141


  INR- 2.1 --> 2 U FFP ordered for transfusion today 


  LFTs improving some today. Alk phos remains the same.  Liver VASQUEZ pending. 





(4/12) Significant change in patients status. She was found unresponsive and 

pulseless, S/P ACLS


  with ROSC.  Now intubated and on Epi and Levo gtts.  Pupils are fixed and 

dilated.


  LFTs from this morning reveal increase in total bili and Alk phos. AST and 

ALT with minimal decrease.


  H/H dropped, currently 7.9/23.2.  No obvious GIB.  OG has not been placed 

yet.  


  Bone marrow biopsy is still pending. 





Liver Work up


KATHLEEN negative, [id number] Iron-233 %sat 99.1 AFP-2.7 [id number] hepatitis panel 

negative ASMA neg


AMA and ceruloplasmin, pending


MRCP (4/9) --> Mild periportal edema in the liver with minimal ascites and 

anasarca. No biliary ductal


  or pancreatic ductal dilatation. Numerous gallstones. 





PLAN


- AMA and ceruloplasmin pending


- Pt has significant change in status, will expect LFTs to go up secondary to 

shock liver


- Will continue to monitor 


- Drop in H/H noted, bone marrow biopsy pending, pt not stable for GI procedures


- Further recommendations based on clinical course





Pt has been seen and examined by myself and Dr. Ayala and this note is 

written on his behalf 


 (Thao Tay)











Thao Tay Apr 12, 2018 11:09


Antony Ayala MD Apr 12, 2018 15:45

## 2018-04-12 NOTE — PD.CONS
[facility]


Service


Critical Care Medicine


Consult Requested By


Code team


Reason for Consult


PEA Arrest


Primary Care Physician


Yuliet Wan MD


History of Present Illness


Due to the emergent nature of the consult, complete history and details are 

unavailable. In brief, this is a 83yF with a history of hemochromatosis and 

afib who was being worked up for elevated LFTs and a possible blood disorder or 

leukemia who was found by a  this morning unresponsive and was found to 

be in PEA arrest. ACLS was started. ROSC was obtained 3 separate time, but each 

time, pulses were lost. ROSC was obtained a final time. emergent intubation and 

central and arterial lines were placed, see separate details. patient was 

started on epinephrine and norepinephrine drips. ABG demonstrated severe 

metabolic acidosis pH 6.9. 6 amps bicarb were given. stat CT head and CTA 

pulmonary angiogram were ordered. discussed case with oncology that suggested 

heparin had been held for bone marrow biopsy so thromboembolic phenomena is on 

the differential.  no additional information is available from the patient. ROS 

unobtainable.





Review of Systems


ROS Limitations:  Clinical Condition, Intubated, Altered Mental Status, 

Unresponsive





Past Family Social History


Allergies:  


Coded Allergies:  


     Penicillins (Verified  Allergy, Severe, vomiting, 3/15/18)


     Sulfa (Sulfonamide Antibiotics) (Verified  Allergy, Severe, vomiting, 3/15/

18)


     ciprofloxacin (Verified  Allergy, Severe, vomiting, 3/15/18)


Past Medical History


unobtainable due to clinical condition. per chart review:





hemochromatosis


atrial fibrillation


hypertension.


Past Surgical History


unobtainable due to clinical condition. per chart review:





hysterectomy


hip replacement.


Reported Medications


Warfarin 1 Mg Tab 1.25 Mg PO TUESDAY


Warfarin 2.5 Mg Tab 2.5 Mg PO DAILY


Valsartan 320 Mg Tab 320 Mg PO DAILY


Premarin (Estrogens Conjugated) 0.3 Mg Tab 0.3 Mg PO DAILY


Omeprazole 20 Mg Tab 20 Mg PO DAILY


Metoprolol Succinate/HCTZ 100-12.5  Mg-12.5 Mg Tab 1 Tab PO DAILY


Cartia Xt (Diltiazem ER 24 HR) 120 Mg Caper 120 Mg PO DAILY


Active Ordered Medications


See MAR


Family History


unobtainable due to clinical condition. per chart review:





leukemia in brother.


Social History


unobtainable due to clinical condition. per chart review:





denied tob. occ etoh.





Physical Exam


Vital Signs





Vital Signs








  Date Time  Temp Pulse Resp B/P (MAP) Pulse Ox O2 Delivery O2 Flow Rate FiO2


 


4/12/18 10:30     100   100


 


4/12/18 10:15     100   100


 


4/12/18 08:00 97.7 110 18 155/82 (106) 99   


 


4/12/18 04:00 97.5 116 20 160/78 (105) 96   


 


4/12/18 00:00 97.8 94 18 155/79 (104) 97   


 


4/11/18 21:03 97.5 99 16 135/76 94   


 


4/11/18 20:00 97.5 102 16 150/76 (100)    


 


4/11/18 19:46      Nasal Cannula 2.00 


 


4/11/18 18:15 98.0 105 17 144/72 (96) 94   


 


4/11/18 18:15 98.0 105 17 144/72 94   


 


4/11/18 18:00 98.5 94 18 141/73 96   


 


4/11/18 16:00 97.8 82 18 140/67 (91) 96   


 


4/11/18 14:40  81 18 135/75 (95) 94   


 


4/11/18 14:10  78 20 130/84 (99) 93   


 


4/11/18 13:55 98.2 74 20 131/80 (97) 92   


 


4/11/18 12:11 97.8 100 19 146/75 97   


 


4/11/18 11:48 97.9 84 19 153/69    








Physical Exam


gen: elderly female, lying in bed, unresponsive, critically ill.


heent: pupils 6mm, equal, dilated, fixed. mucous membranes moist.


neck: no jvd. trachea midline. 


chest: intubated with 8.0 ett at 22cm at the teeth. coarse bilateral breath 

sounds.


cv: tachycardic rate, irregularly irregular rhythm. afib. on norepinephrine and 

epinephrine


abd: soft, nontender, nondistended. no guarding.


extr: cool, poorly perfused. mottled.  no edema.


neuro: GCS 3. pupils as above. - cough. - gag. - corneals.


Laboratory





Laboratory Tests








Test


  4/11/18


13:15 4/12/18


05:26 4/12/18


10:20


 


White Blood Count  27.1  


 


Red Blood Count  2.49  


 


Hemoglobin  7.9  


 


Hematocrit  23.2  


 


Mean Corpuscular Volume  93.0  


 


Mean Corpuscular Hemoglobin  31.5  


 


Mean Corpuscular Hemoglobin


Concent 


  33.9 


  


 


 


Red Cell Distribution Width  19.1  


 


Platelet Count  130  


 


Mean Platelet Volume  11.3  


 


Blood Urea Nitrogen  24  


 


Creatinine  0.77  


 


Random Glucose  133  


 


Total Protein  7.3  


 


Albumin  2.9  


 


Calcium Level  8.2  


 


Magnesium Level  1.8  


 


Alkaline Phosphatase  280  


 


Aspartate Amino Transf


(AST/SGOT) 


  163 


  


 


 


Alanine Aminotransferase


(ALT/SGPT) 


  145 


  


 


 


Total Bilirubin  6.7  


 


Direct Bilirubin  4.3  


 


Sodium Level  139  


 


Potassium Level  3.7  


 


Chloride Level  106  


 


Carbon Dioxide Level  21.8  


 


Anion Gap  11  


 


Estimat Glomerular Filtration


Rate 


  72 


  


 


 


Indirect Bilirubin  2.4  


 


Blood Gas Puncture Site   ART LINE 


 


Blood Gas Patient Temperature   98.6 


 


Blood Gas HCO3   8 


 


Blood Gas Base Excess   -21.6 


 


Blood Gas Oxygen Saturation   96 


 


Arterial Blood pH   6.97 


 


Arterial Blood Partial


Pressure CO2 


  


  36 


 


 


Arterial Blood Partial


Pressure O2 


  


  185 


 


 


Arterial Blood Oxygen Content   9.3 


 


Arterial Blood


Carboxyhemoglobin 


  


  0.5 


 


 


Arterial Blood Methemoglobin   1.2 


 


Blood Gas Hemoglobin   6.6 


 


Oxygen Delivery Device   VENTILATOR 


 


Blood Gas Ventilator Setting


  


  


  PRVC24/500/1.0/+8


 


 


Blood Gas Inspired Oxygen   100 








Result Diagram:  


4/12/18 0526 4/12/18 0526





Imaging





Last Impressions








Bone Biopsy CT 4/11/18 0818 Signed





Impressions: 





 Service Date/Time:  Wednesday, April 11, 2018 13:18 - CONCLUSION:  1.  





 Uncomplicated CT guided bone marrow aspirate. 2.  Uncomplicated CT guided bone 





 marrow biopsy.     Mikel Felder MD  FACR


 


Chest X-Ray 4/9/18 0341 Signed





Impressions: 





 Service Date/Time:  Monday, April 9, 2018 03:58 - CONCLUSION: Bibasilar 





 opacities.     Ziyad Sexton MD 


 


Cholangiopancreatography MRI 4/9/18 0000 Signed





Impressions: 





 Service Date/Time:  Monday, April 9, 2018 18:50 - CONCLUSION:  Small to 

moderate 





 bilateral complex pleural effusions.  Mild periportal edema in the liver with 





 minimal ascites and anasarca.  No biliary ductal or pancreatic ductal 





 dilatation. Numerous gallstones.     Rogerio Parks MD 











Assessment and Plan


Assessment and Plan


Assessment: 83yF with afib and being worked up for anemia and possible leukemia 

who has now sustained PEA arrest and is in cardiogenic shock. very critically 

ill. CT brain and pulmonary angiogram to rule out catastrophic bleed or PE. 

have had discussion with significant other- very unlikely to survive this acute 

insult.





Plan by systems:





Neurologic:


Hypoxic ischemic encephalopathy


Frequent neurochecks


Avoid sedatives


Not a candidate for post cardiac arrest hypothermia protocol due to concern for 

coagulopathy, anemia, cancer


Stat head CT





Respiratory:


Acute hypoxic and hypercarbic respiratory failure


Wean FiO2 for goal SPO2 greater than 90%


Vent bundle


Elevated Head of bed


Nebs


No weaning of mechanical ventilation until mental status improves





Cardiovascular:


Cardiogenic shock


Status post PEA arrest


Atrial fibrillation


Continue norepinephrine and epinephrine for goal map greater than 65


Stat CT pulmonary angiogram


Add pulse contour analysis


Trend lactate





Renal:


Acute kidney injury


Place Dhaliwal


-- Strict I/Os





FEN/GI:


Severe acute anion gap metabolic acidosis


Hypokalemia


Lactic acidosis


Acute protein calorie malnutrition-moderate


ICU electrolyte protocol


Normal saline IV fluids


N.p.o. while in shock


Daily BMP


Trend ABGs





Heme/ID:


Severe anemia-unknown etiology being worked up by hematology


Continue Rocephin and azithromycin per primary team's recommendation for 

empiric coverage of possible community acquired pneumonia


Will defer to hematology regarding workup of anemia


Transfuse 1 unit PRBCs for hemoglobin of 6.6





Endocrine:


Hyperglycemia of critical illness


-- SSI, medium scale, every 4





Prophylaxis:


GI Prophylaxis


Pepcid





DVT Prophylaxis


-- SCDs


Patient was on heparin drip for atrial fibrillation, we will plan to hold this 

and follow-up CT brain scan in CT pulmonary angiogram before deciding whether 

or not to continue





Lines:


4/12 right femoral triple-lumen catheter


4/12 left brachial arterial line


Dhaliwal





Dispo:


Very critically ill.  Admit to ICU.





This patient remains critically ill with one or more organ systems which are or 

may become a threat to life.  I have spent in excess of 84 minutes 

discontinuously in the care and management of this patient.  This time is 

exclusive of procedures, and includes, but is not limited to, evaluation of the 

patient, review of the medical record, discussions with family, consultants, 

nursing staff, or respiratory therapy, and documentation in the medical record.











Diego Eisenberg MD Apr 12, 2018 11:14

## 2018-04-12 NOTE — PD.PROCEDR
Procedure Note


Procedure


Tube Thoracostomy Procedure Note





Right-sided 32 [place] thoracostomy tube





Diagnosis: Hemothorax





Indications: Hemothorax with impaired ventilation





Consent: Emergent





Anesthesia: none





Description of the Procedure: The patient was placed in the supine position.  

The arm was abducted above the head and secured.  The right lateral chest was 

prepped and draped sterilely to include the axilla and nipple.  The 5th 

intercostal space was identified.  A small incision was made using a #11 blade. 

At the mid-axillary line, blunt dissection was performed until the rib was 

palpated.  A Apoorva clamp was used to bluntly enter the pleura immediately above 

the adjacent rib.  The space was opened bluntly with the Apoorva clamp. A finger 

was inserted and lung and pleura were felt.  A 32 Fr chest tube was inserted 

along the course of the finger and into the pleural cavity easily and without 

resistance.  The chest tube was connected to a Pleur-o-vac and connected to 

[id number] suction.  The catheter was sutured to the skin using a 0 silk sandal 

suture and an occlusive dressing was applied.  There were no immediate 

complications noted.  There was minimal EBL.  The patient tolerated the 

procedure well.





Chest Tube output: 800 mL sanguinous output





A Chest x-ray has been ordered.





I personally performed the procedure.











Diego Eisenberg MD Apr 12, 2018 13:55

## 2018-04-12 NOTE — RADRPT
EXAM DATE/TIME:  04/12/2018 10:24 

 

[facility] COMPARISON:     

CHEST SINGLE AP, April 09, 2018, 3:58.

 

                     

INDICATIONS :     

Respiratory failure, intubation

Code blue

                     

 

MEDICAL HISTORY :     

Anemia, leukocytosis, hemachromatosis, hypertension   

 

SURGICAL HISTORY :     

Hysterectomy.   

 

ENCOUNTER:     

Subsequent                                        

 

ACUITY:     

3 days      

 

PAIN SCORE:     

Non-responsive.

 

LOCATION:     

Bilateral chest 

 

FINDINGS:     

Portable upright single view of the chest demonstrates cardiac silhouette size at the upper limits fo
r normal. The endotracheal tube extends into the right main bronchus by approximately 2.5 cm. There a
re is new severe upper lobe consolidation bilaterally, right greater than left. Mild air space consol
idation remains in the right lower lung zone. There is likely a small left pleural effusion. No pneum
othorax is visualized. Bones demonstrate no acute finding.

 

CONCLUSION:     

1. The endotracheal tube extends into the right main bronchus and should be retracted approximately 4
 cm. This finding was telephoned to the charge nurse in the ICU at the time of this dictation. The pa
tient is in transit to CT department.

2. New severe bilateral upper lung zone predominant airspace consolidation. The rapid development and
 distribution may indicate pulmonary edema.

3. Stable right lower lung zone airspace consolidation and suspected small left pleural effusion.

 

 

 

 Anderson Borrego MD on April 12, 2018 at 11:40           

Board Certified Radiologist.

 This report was verified electronically.

## 2018-04-12 NOTE — HHI.DS
Death Summary Note


Date of Death:  2018


Time Of Death:  14:16


Admission Date


2018 at 04:47


Admitting Diagnosis





SOB, anemia, jaundice


Diagnosis at Time of Death:  


(1) Anemia


ICD Code:  D64.9 - Anemia, unspecified


Diagnosis:  Principal





Procedures


Bone marrow biopsy 


Brief History


patient is a 82 y/o female with history of hemochromatosis,atrial fibrillation 

and hypertension who presented to ER with worsening anemia. she was admitted to 

this hospital about a month ago for the same reason. she was transfused with 

PRBC, evaluated by hematology and then discharged home. she says that initially 

she was feeling fine but gradually her sob got worse which made her to come 

back to ER. she denies any dizziness, chest pain, rectal bleed, black stools, 

cough or sputum production. she denies any abdominal pain, nausea or vomiting.


CBC/BMP:  


18 1130                                                                   

             18 1130





Significant Findings





Laboratory Tests








Test


  18


17:45 4/10/18


05:09 18


08:02 18


13:15


 


Erythrocyte Sedimentation Rate


  45 mm/hr


(0-30) 


  


  


 


 


Iron Level


  233 MCG/DL


() 


  


  


 


 


Total Iron Binding Capacity


  235 MCG/DL


(250-450) 


  


  


 


 


Percent Iron Saturation 99.1 % (20-50)    


 


Ferritin


  3311 NG/ML


(8-252) 


  


  


 


 


Lactate Dehydrogenase


  970 U/L


() 


  


  


 


 


C-Reactive Protein


  2.70 MG/DL


(0.00-0.30) 


  


  


 


 


Albumin/Globulin Ratio


  0.98


(1.39-2.23) 


  


  


 


 


Alpha-1-Globulins


  0.30 GM/DL


(0.11-0.29) 


  


  


 


 


Gamma Globulins


  2.23 GM/DL


(0.50-1.39) 


  


  


 


 


White Blood Count


  


  30.4 TH/MM3


(4.0-11.0) 29.2 TH/MM3


(4.0-11.0) 


 


 


Red Blood Count


  


  2.74 MIL/MM3


(4.00-5.30) 2.72 MIL/MM3


(4.00-5.30) 


 


 


Hemoglobin


  


  8.5 GM/DL


(11.6-15.3) 8.6 GM/DL


(11.6-15.3) 


 


 


Hematocrit


  


  24.9 %


(35.0-46.0) 25.0 %


(35.0-46.0) 


 


 


Red Cell Distribution Width


  


  18.4 %


(11.6-17.2) 19.1 %


(11.6-17.2) 


 


 


Platelet Count


  


  133 TH/MM3


(150-450) 141 TH/MM3


(150-450) 


 


 


Mean Platelet Volume


  


  11.1 FL


(7.0-11.0) 11.3 FL


(7.0-11.0) 


 


 


Band Neutrophils %  13 % (0-6)  10 % (0-6)  


 


Lymphocytes %  1 % (9-44)  6 % (9-44)  


 


Monocytes %  10 % (0-8)  11 % (0-8)  


 


Neutrophils # (Manual)


  


  26.4 TH/MM3


(1.8-7.7) 23.4 TH/MM3


(1.8-7.7) 


 


 


Metamyelocytes  3 % (0-1)   


 


Myelocytes  1 % (0-0)   


 


Promyelocytes  1 % (0-0)   


 


Toxic Granulation  2+ (NORMAL)  1+ (NORMAL)  


 


Platelet Estimate  LOW (NORMAL)  LOW (NORMAL)  


 


Ovalocytes  1+ (NORMAL)   


 


Prothrombin Time


  


  31.6 SEC


(9.8-11.6) 21.0 SEC


(9.8-11.6) 


 


 


Blood Urea Nitrogen


  


  21 MG/DL


(7-18) 21 MG/DL


(7-18) 


 


 


Albumin


  


  2.8 GM/DL


(3.4-5.0) 2.6 GM/DL


(3.4-5.0) 


 


 


Calcium Level


  


  7.9 MG/DL


(8.5-10.1) 8.1 MG/DL


(8.5-10.1) 


 


 


Alkaline Phosphatase


  


  265 U/L


() 265 U/L


() 


 


 


Aspartate Amino Transf


(AST/SGOT) 


  400 U/L


(15-37) 185 U/L


(15-37) 


 


 


Alanine Aminotransferase


(ALT/SGPT) 


  239 U/L


(10-53) 166 U/L


(10-53) 


 


 


Total Bilirubin


  


  6.2 MG/DL


(0.2-1.0) 5.3 MG/DL


(0.2-1.0) 


 


 


Alpha-1-Antitrypsin


  


  197 mg/dL (100


- 190) 


  


 


 


Platelet Morphology Comment


  


  


  ENLARGED


(NORMAL) 


 


 


Acanthocytes   OCC (NORMAL)  


 


Random Glucose


  


  


  157 MG/DL


() 


 


 


Carbon Dioxide Level


  


  


  20.8 MEQ/L


(21.0-32.0) 


 


 


Estimat Glomerular Filtration


Rate 


  


  73 ML/MIN


(>89) 


 


 


Test


  18


05:26 18


10:20 18


11:20 18


11:30


 


White Blood Count


  27.1 TH/MM3


(4.0-11.0) 


  


  43.6 TH/MM3


(4.0-11.0)


 


Red Blood Count


  2.49 MIL/MM3


(4.00-5.30) 


  


  1.89 MIL/MM3


(4.00-5.30)


 


Hemoglobin


  7.9 GM/DL


(11.6-15.3) 


  


  5.9 GM/DL


(11.6-15.3)


 


Hematocrit


  23.2 %


(35.0-46.0) 


  


  18.6 %


(35.0-46.0)


 


Red Cell Distribution Width


  19.1 %


(11.6-17.2) 


  


  20.0 %


(11.6-17.2)


 


Platelet Count


  130 TH/MM3


(150-450) 


  


  86 TH/MM3


(150-450)


 


Mean Platelet Volume


  11.3 FL


(7.0-11.0) 


  


  


 


 


Blood Urea Nitrogen


  24 MG/DL


(7-18) 


  


  27 MG/DL


(7-18)


 


Random Glucose


  133 MG/DL


() 


  


  65 MG/DL


()


 


Albumin


  2.9 GM/DL


(3.4-5.0) 


  


  1.9 GM/DL


(3.4-5.0)


 


Calcium Level


  8.2 MG/DL


(8.5-10.1) 


  


  6.8 MG/DL


(8.5-10.1)


 


Alkaline Phosphatase


  280 U/L


() 


  


  244 U/L


()


 


Aspartate Amino Transf


(AST/SGOT) 163 U/L


(15-37) 


  


  6004 U/L


(15-37)


 


Alanine Aminotransferase


(ALT/SGPT) 145 U/L


(10-53) 


  


  2471 U/L


(10-53)


 


Total Bilirubin


  6.7 MG/DL


(0.2-1.0) 


  


  5.8 MG/DL


(0.2-1.0)


 


Direct Bilirubin


  4.3 MG/DL


(0.0-0.2) 


  


  


 


 


Estimat Glomerular Filtration


Rate 72 ML/MIN


(>89) 


  


  46 ML/MIN


(>89)


 


Indirect Bilirubin


  2.4 MG/DL


(0.0-0.8) 


  


  


 


 


Blood Gas HCO3


  


  8 mmol/L


(22-26) 14 mmol/L


(22-26) 


 


 


Blood Gas Base Excess


  


  -21.6 mmol/L


(-2-2) -12.3 mmol/L


(-2-2) 


 


 


Arterial Blood pH


  


  6.97


(7.380-7.420) 7.22


(7.380-7.420) 


 


 


Arterial Blood Partial


Pressure CO2 


  36 mmHg


(38-42) 36 mmHg


(38-42) 


 


 


Arterial Blood Partial


Pressure O2 


  185 mmHg


() 


  


 


 


Arterial Blood Oxygen Content


  


  9.3 Vol %


(12.0-20.0) 6.9 Vol %


(12.0-20.0) 


 


 


Blood Gas Hemoglobin


  


  6.6 G/DL


(12.0-16.0) 5.2 G/DL


(12.0-16.0) 


 


 


Mean Corpuscular Hemoglobin


Concent 


  


  


  31.5 %


(32.0-36.0)


 


Prothrombin Time


  


  


  


  71.7 SEC


(9.8-11.6)


 


Prothromb Time International


Ratio 


  


  


  7.2 RATIO 


 


 


Activated Partial


Thromboplast Time 


  


  


  128.7 SEC


(24.3-30.1)


 


Fibrinogen


  


  


  


  LESS THAN 50


mg/dL


 


D-Dimer Quantitative (PE/DVT)


  


  


  


  GREATER THAN


35.20 MG/L FEU


 


Creatinine


  


  


  


  1.14 MG/DL


(0.50-1.00)


 


Total Protein


  


  


  


  5.4 GM/DL


(6.4-8.2)


 


Phosphorus Level


  


  


  


  7.1 MG/DL


(2.5-4.9)


 


Sodium Level


  


  


  


  150 MEQ/L


(136-145)


 


Carbon Dioxide Level


  


  


  


  15.0 MEQ/L


(21.0-32.0)


 


Anion Gap


  


  


  


  28 MEQ/L


(5-15)


 


Lactic Acid Level


  


  


  


  23.7 mmol/L


(0.4-2.0)


 


Protein Corrected Calcium


  


  


  


  7.7 MG/DL


(8.5-10.1)








Imaging





Last Impressions








Bone Biopsy CT 1818 Signed





Impressions: 





 Service Date/Time:  2018 13:18 - CONCLUSION:  1.  





 Uncomplicated CT guided bone marrow aspirate. 2.  Uncomplicated CT guided bone 





 marrow biopsy.     Mikel Felder MD  FACR


 


Chest X-Ray 18 0341 Signed





Impressions: 





 Service Date/Time:  2018 03:58 - CONCLUSION: Bibasilar 





 opacities.     Ziyad Sexton MD 


 


Cholangiopancreatography MRI 18 0000 Signed





Impressions: 





 Service Date/Time:  2018 18:50 - CONCLUSION:  Small to 

moderate 





 bilateral complex pleural effusions.  Mild periportal edema in the liver with 





 minimal ascites and anasarca.  No biliary ductal or pancreatic ductal 





 dilatation. Numerous gallstones.     Rogerio Parks MD 








Hospital Course


Due to the emergent nature of the consult, complete history and details are 

unavailable. In brief, this is a 83yF with a history of hemochromatosis and 

afib who was being worked up for elevated LFTs and a possible blood disorder or 

leukemia who was found by a  this morning unresponsive and was found to 

be in PEA arrest. ACLS was started. ROSC was obtained 3 separate time, but each 

time, pulses were lost. ROSC was obtained a final time. emergent intubation and 

central and arterial lines were placed, see separate details. patient was 

started on epinephrine and norepinephrine drips. ABG demonstrated severe 

metabolic acidosis pH 6.9. 6 amps bicarb were given. stat CT head and CTA 

pulmonary angiogram were ordered. discussed case with oncology that suggested 

heparin had been held for bone marrow biopsy so thromboembolic phenomena is on 

the differential.  no additional information is available from the patient. ROS 

unobtainable.





CT Pulmonary angiogram was negative for PE and CT brain negative for bleeds. 

despite this she developed DIC and massive hemorrhage.  She had an additional 

cardiac arrest and additional rounds of ACLS were started. Despite our best 

efforts, she  at 14:16.











Diego Eisenberg MD 2018 14:19

## 2018-04-12 NOTE — RADRPT
EXAM DATE/TIME:  04/12/2018 11:55 

 

[facility] COMPARISON:     

CT BRAIN W/O CONTRAST, March 15, 2018, 15:46.

 

 

INDICATIONS :     

Altered mental status. 

                      

 

RADIATION DOSE:     

44.68 CTDIvol (mGy) 

 

 

 

MEDICAL HISTORY :     

Hypertension.  AFIB

 

SURGICAL HISTORY :      

Hysterectomy. 

 

ENCOUNTER:      

Initial

 

ACUITY:      

1 day

 

PAIN SCALE:      

Non-responsive

 

LOCATION:        

cranial 

 

TECHNIQUE:     

Multiple contiguous axial images were obtained of the head.  Using automated exposure control and adj
ustment of the mA and/or kV according to patient size, radiation dose was kept as low as reasonably a
chievable to obtain optimal diagnostic quality images.   DICOM format image data is available electro
nically for review and comparison.  

 

FINDINGS:     

 

CEREBRUM:     

There is generalized cerebral atrophy. Ventricles are mildly enlarged but within the range of expecte
d given the degree of atrophy. There is periventricular white matter low attenuation and a stable lac
une is present in the left caudate nucleus head.  No evidence of midline shift, mass lesion, hemorrha
ge or acute infarction.  No extra-axial fluid collections are seen.

 

POSTERIOR FOSSA:     

The cerebellum and brainstem are intact.  The 4th ventricle is midline.  The cerebellopontine angle i
s unremarkable.

 

EXTRACRANIAL:     

Visualized sinuses are clear.

 

SKULL:     

The calvaria is intact.  No evidence of skull fracture.

 

CONCLUSION:     

1. No acute intracranial abnormality is identified.

2. Stable chronic findings include generalized atrophy and chronic periventricular white matter gaspar
e. There is a stable old lacune in the left caudate nucleus head.

 

 

 

 Anderson Borrego MD on April 12, 2018 at 12:03           

Board Certified Radiologist.

 This report was verified electronically.

## 2018-04-12 NOTE — PD.PROCEDR
Central Line Procedure


REASON FOR PROCEDURE


Cardiac arrest/PEA





PROCEDURE PERFORMED


Central line placement: R femoral central line placed during CPR





CONSENT


Emergency procedure





ANESTHESIA


Local injection of 1% Lidocaine





DESCRIPTION OF THE PROCEDURE


The patient was placed in supine, mild Trendelenburg position.  The area was 

exposed and cleansed with ChloraPrep, times two.  Large sterile drape was used 

to cover the patient.  On single attempt, the introducer needle was inserted 

with negative pressure in syringe and venous flash was obtained.  The guide 

wire was then advanced without any restriction and the needle was removed.  The 

dilator was used without any complications.  Using Seldinger technique the 20 

CM 7F triple lumen catheter was advanced over the guide wire to a depth of 19 

centimeters.  The guide wire was removed.  All ports were aspirated with dark 

venous blood return and flushed easily with sterile saline.  All ports were 

capped.  Antibiotic disc was placed around central line at puncture site.  The 

central line was secured to the skin with two interrupted 2.0 silk sutures.  

The area was bandaged with sterile see-through central line bandage.





COMPLICATIONS:


No apparent complications





ESTIMATED BLOOD LOSS:


Less than 1 cc.











Gilberto Barron MD Apr 12, 2018 11:05

## 2018-04-13 LAB — CERULOPLASMIN SERPL-MCNC: 52 MG/DL (ref 18–53)

## 2018-04-14 NOTE — EKG
Date Performed: 04/12/2018       Time Performed: 10:19:14

 

PTAGE:      83 years

 

EKG:      Atrial fibrillation. Right bundle branch block with secondary ST-T wave changes Compared to
 previous tracing, the right bundle branch block and the marked ST-T wave changes are new. Cannot exc
lude ischemia. Clinical correlation needed. Abnormal ECG 

 

NO PREVIOUS TRACING            

 

DOCTOR:   Ozzie Macias  Interpretating Date/Time  04/14/2018 11:27:24